# Patient Record
Sex: MALE | Race: WHITE | Employment: FULL TIME | URBAN - METROPOLITAN AREA
[De-identification: names, ages, dates, MRNs, and addresses within clinical notes are randomized per-mention and may not be internally consistent; named-entity substitution may affect disease eponyms.]

---

## 2022-06-23 ENCOUNTER — OFFICE VISIT (OUTPATIENT)
Dept: PRIMARY CARE CLINIC | Age: 37
End: 2022-06-23
Payer: COMMERCIAL

## 2022-06-23 VITALS
BODY MASS INDEX: 20.77 KG/M2 | WEIGHT: 148.4 LBS | TEMPERATURE: 97.3 F | HEART RATE: 76 BPM | SYSTOLIC BLOOD PRESSURE: 128 MMHG | HEIGHT: 71 IN | DIASTOLIC BLOOD PRESSURE: 84 MMHG | OXYGEN SATURATION: 98 %

## 2022-06-23 DIAGNOSIS — K21.9 GASTROESOPHAGEAL REFLUX DISEASE WITHOUT ESOPHAGITIS: ICD-10-CM

## 2022-06-23 DIAGNOSIS — I10 BENIGN ESSENTIAL HTN: ICD-10-CM

## 2022-06-23 DIAGNOSIS — G60.0 CMT (CHARCOT-MARIE-TOOTH DISEASE): ICD-10-CM

## 2022-06-23 DIAGNOSIS — Z00.00 ENCOUNTER FOR ANNUAL PHYSICAL EXAM: Primary | ICD-10-CM

## 2022-06-23 PROCEDURE — 99385 PREV VISIT NEW AGE 18-39: CPT | Performed by: STUDENT IN AN ORGANIZED HEALTH CARE EDUCATION/TRAINING PROGRAM

## 2022-06-23 RX ORDER — BACLOFEN 10 MG/1
10 TABLET ORAL PRN
COMMUNITY

## 2022-06-23 RX ORDER — PANTOPRAZOLE SODIUM 40 MG/1
40 TABLET, DELAYED RELEASE ORAL DAILY
COMMUNITY
End: 2022-10-03 | Stop reason: SDUPTHER

## 2022-06-23 RX ORDER — METOPROLOL SUCCINATE 50 MG/1
50 TABLET, EXTENDED RELEASE ORAL DAILY
COMMUNITY
End: 2022-06-23 | Stop reason: SDUPTHER

## 2022-06-23 RX ORDER — METOPROLOL SUCCINATE 50 MG/1
50 TABLET, EXTENDED RELEASE ORAL DAILY
Qty: 90 TABLET | Refills: 1 | Status: SHIPPED | OUTPATIENT
Start: 2022-06-23

## 2022-06-23 SDOH — ECONOMIC STABILITY: FOOD INSECURITY: WITHIN THE PAST 12 MONTHS, YOU WORRIED THAT YOUR FOOD WOULD RUN OUT BEFORE YOU GOT MONEY TO BUY MORE.: NEVER TRUE

## 2022-06-23 SDOH — ECONOMIC STABILITY: FOOD INSECURITY: WITHIN THE PAST 12 MONTHS, THE FOOD YOU BOUGHT JUST DIDN'T LAST AND YOU DIDN'T HAVE MONEY TO GET MORE.: NEVER TRUE

## 2022-06-23 ASSESSMENT — PATIENT HEALTH QUESTIONNAIRE - PHQ9
SUM OF ALL RESPONSES TO PHQ QUESTIONS 1-9: 0
SUM OF ALL RESPONSES TO PHQ QUESTIONS 1-9: 0
1. LITTLE INTEREST OR PLEASURE IN DOING THINGS: 0
2. FEELING DOWN, DEPRESSED OR HOPELESS: 0
SUM OF ALL RESPONSES TO PHQ QUESTIONS 1-9: 0
SUM OF ALL RESPONSES TO PHQ9 QUESTIONS 1 & 2: 0
SUM OF ALL RESPONSES TO PHQ QUESTIONS 1-9: 0

## 2022-06-23 ASSESSMENT — SOCIAL DETERMINANTS OF HEALTH (SDOH): HOW HARD IS IT FOR YOU TO PAY FOR THE VERY BASICS LIKE FOOD, HOUSING, MEDICAL CARE, AND HEATING?: NOT HARD AT ALL

## 2022-06-23 NOTE — PROGRESS NOTES
2022    Patience Moseley (:  1985) is a 39 y.o. male, here for a preventive medicine evaluation. Patient Active Problem List   Diagnosis    CMT (Charcot-Sariah-Tooth disease)    Benign essential HTN    Gastroesophageal reflux disease without esophagitis     Moved from [facility] due to son having brainstem tumor. HYPERTENSION  - was going to stop BP meds doesnt want to do too many changes     Charcot Fernlit Pulido Tooth  - takes baclofen 10 mg PRN  - son sees procedures Dr Connell at [facility], Dr bianka taveras  - Wears braces typically both feet    Pt has a hiatal hernia and will get protonix Dr Marino Anand in [facility]. Review of Systems   All other systems reviewed and are negative. Prior to Visit Medications    Medication Sig Taking? Authorizing Provider   baclofen (LIORESAL) 10 MG tablet Take 10 mg by mouth as needed Yes Historical Provider, MD   pantoprazole (PROTONIX) 40 MG tablet Take 40 mg by mouth daily Yes Historical Provider, MD   metoprolol succinate (TOPROL XL) 50 MG extended release tablet Take 1 tablet by mouth daily Yes Ahmet Wiggins MD        No Known Allergies    History reviewed. No pertinent past medical history. No past surgical history on file. Social History     Socioeconomic History    Marital status:      Spouse name: Not on file    Number of children: Not on file    Years of education: Not on file    Highest education level: Not on file   Occupational History    Not on file   Tobacco Use    Smoking status: Never Smoker    Smokeless tobacco: Never Used   Substance and Sexual Activity    Alcohol use:  Yes     Alcohol/week: 2.0 standard drinks     Types: 2 Cans of beer per week     Comment: Occasional drinker    Drug use: Never    Sexual activity: Not on file   Other Topics Concern    Not on file   Social History Narrative    Not on file     Social Determinants of Health     Financial Resource Strain: Low Risk     Difficulty of Paying Living Expenses: Not hard at all   Food Insecurity: No Food Insecurity    Worried About [facility] in the Last Year: Never true    Deirder of Food in the Last Year: Never true   Transportation Needs:     Lack of Transportation (Medical): Not on file    Lack of Transportation (Non-Medical): Not on file   Physical Activity:     Days of Exercise per Week: Not on file    Minutes of Exercise per Session: Not on file   Stress:     Feeling of Stress : Not on file   Social Connections:     Frequency of Communication with Friends and Family: Not on file    Frequency of Social Gatherings with Friends and Family: Not on file    Attends Anabaptism Services: Not on file    Active Member of [address] [place] [address] [employer] or Organizations: Not on file    Attends Club or Organization Meetings: Not on file    Marital Status: Not on file   Intimate Partner Violence:     Fear of Current or Ex-Partner: Not on file    Emotionally Abused: Not on file    Physically Abused: Not on file    Sexually Abused: Not on file   Housing Stability:     Unable to Pay for Housing in the Last Year: Not on file    Number of [place] in the Last Year: Not on file    Unstable Housing in the Last Year: Not on file        No family history on file. ADVANCE DIRECTIVE: N, <no information>    Vitals:    06/23/22 0947   BP: 128/84   Site: Right Upper Arm   Position: Sitting   Cuff Size: Large Adult   Pulse: 76   Temp: 97.3 °F (36.3 °C)   TempSrc: Temporal   SpO2: 98%   Weight: 148 lb 6.4 oz (67.3 kg)   Height: 5' 11\" (1.803 m)     Estimated body mass index is 20.7 kg/m² as calculated from the following:    Height as of this encounter: 5' 11\" (1.803 m). Weight as of this encounter: 148 lb 6.4 oz (67.3 kg). Physical Exam  Vitals reviewed. Constitutional:       General: He is not in acute distress. Appearance: Normal appearance. He is not ill-appearing. HENT:      Head: Normocephalic and atraumatic.       Right Ear: Tympanic membrane, ear canal and external ear normal.      Left Ear: Tympanic membrane, ear canal and external ear normal.      Nose: Nose normal. No rhinorrhea. Mouth/Throat:      Mouth: Mucous membranes are moist.      Pharynx: Oropharynx is clear. No oropharyngeal exudate. Eyes:      General: No scleral icterus. Right eye: No discharge. Left eye: No discharge. Extraocular Movements: Extraocular movements intact. Conjunctiva/sclera: Conjunctivae normal.   Cardiovascular:      Rate and Rhythm: Normal rate and regular rhythm. Pulses: Normal pulses. Heart sounds: Normal heart sounds. No murmur heard. No gallop. Pulmonary:      Effort: Pulmonary effort is normal.      Breath sounds: Normal breath sounds. No wheezing, rhonchi or rales. Abdominal:      General: Abdomen is flat. Bowel sounds are normal. There is no distension. Palpations: Abdomen is soft. There is no mass. Musculoskeletal:         General: Normal range of motion. Cervical back: Neck supple. No muscular tenderness. Lymphadenopathy:      Cervical: No cervical adenopathy. Skin:     General: Skin is warm. Capillary Refill: Capillary refill takes less than 2 seconds. Findings: No rash. Neurological:      General: No focal deficit present. Mental Status: He is alert. Cranial Nerves: No cranial nerve deficit. Psychiatric:         Mood and Affect: Mood normal.         Behavior: Behavior normal.         No flowsheet data found. No results found for: CHOL, CHOLFAST, TRIG, TRIGLYCFAST, HDL, LDLCHOLESTEROL, LDLCALC, GLUF, GLUCOSE, LABA1C    The ASCVD Risk score (Kendal Diaz., et al., 2013) failed to calculate for the following reasons:     The 2013 ASCVD risk score is only valid for ages 36 to 78    Immunization History   Administered Date(s) Administered    COVID-19, J&J, PF, 0.5 mL 04/08/2021    COVID-19, Pfizer Purple top, DILUTE for use, 12+ yrs, 30mcg/0.3mL dose 04/27/2022    Influenza, Quadv, IM, PF (6 mo and older Fluzone, Flulaval, Fluarix, and 3 yrs and older Afluria) 10/20/2018, 10/13/2020, 01/25/2022    Tdap (Boostrix, Adacel) 09/17/2017       Health Maintenance   Topic Date Due    Varicella vaccine (1 of 2 - 2-dose childhood series) Never done    HIV screen  Never done    Hepatitis C screen  Never done    Depression Screen  06/23/2023    DTaP/Tdap/Td vaccine (2 - Td or Tdap) 09/17/2027    Flu vaccine  Completed    COVID-19 Vaccine  Completed    Hepatitis A vaccine  Aged Out    Hepatitis B vaccine  Aged Out    Hib vaccine  Aged Out    Meningococcal (ACWY) vaccine  Aged Out    Pneumococcal 0-64 years Vaccine  Aged Out       Assessment & Plan   Encounter for annual physical exam  General wellness exam. Reviewed chart for past hx and updated today. Counseled on age appropriate health guidance and discussed screening recommendations. Vaccinations reviewed and discussed. All questions answered    CMT (Charcot-Sariah-Tooth disease)  Doing ok with baclofen, would like neuro input, if needed, can restart gabapentin  [facility] Neurology  Benign essential HYPERTENSION  Stable chronic  -     metoprolol succinate (TOPROL XL) 50 MG extended release tablet; Take 1 tablet by mouth daily, Disp-90 tablet, R-1Normal  - Will request records from previous PCP since he said he recently had labs done. Gastroesophageal reflux disease without esophagitis  - continue with protonix    Return in about 6 months (around 12/23/2022) for chronic problems.          --Lili Oden MD

## 2022-10-03 ENCOUNTER — TELEPHONE (OUTPATIENT)
Dept: PRIMARY CARE CLINIC | Age: 37
End: 2022-10-03

## 2022-10-03 RX ORDER — PANTOPRAZOLE SODIUM 40 MG/1
40 TABLET, DELAYED RELEASE ORAL DAILY
Qty: 90 TABLET | Refills: 1 | Status: SHIPPED | OUTPATIENT
Start: 2022-10-03 | End: 2022-10-13 | Stop reason: ALTCHOICE

## 2022-10-03 NOTE — TELEPHONE ENCOUNTER
Pt wife called requesting :    Last office visit 6/23/2022     Last written 6/23/2022    Next office visit scheduled Visit date not found    Requested Prescriptions     Pending Prescriptions Disp Refills    pantoprazole (PROTONIX) 40 MG tablet 30 tablet      Sig: Take 1 tablet by mouth daily     Pt is out of medication. Please send to [facility] on [address].

## 2022-10-13 ENCOUNTER — OFFICE VISIT (OUTPATIENT)
Dept: PRIMARY CARE CLINIC | Age: 37
End: 2022-10-13
Payer: COMMERCIAL

## 2022-10-13 ENCOUNTER — HOSPITAL ENCOUNTER (OUTPATIENT)
Dept: GENERAL RADIOLOGY | Age: 37
Discharge: HOME | End: 2022-10-13
Payer: COMMERCIAL

## 2022-10-13 ENCOUNTER — TELEPHONE (OUTPATIENT)
Dept: PRIMARY CARE CLINIC | Age: 37
End: 2022-10-13

## 2022-10-13 VITALS
HEIGHT: 71 IN | RESPIRATION RATE: 16 BRPM | BODY MASS INDEX: 34.58 KG/M2 | OXYGEN SATURATION: 96 % | HEART RATE: 75 BPM | TEMPERATURE: 97.1 F | DIASTOLIC BLOOD PRESSURE: 84 MMHG | WEIGHT: 247 LBS | SYSTOLIC BLOOD PRESSURE: 112 MMHG
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
## 2022-10-13 DIAGNOSIS — R19.5 DARK STOOLS: ICD-10-CM

## 2022-10-13 DIAGNOSIS — R73.9 HYPERGLYCEMIA: ICD-10-CM

## 2022-10-13 DIAGNOSIS — R07.81 RIB PAIN: ICD-10-CM

## 2022-10-13 DIAGNOSIS — K21.00 GASTROESOPHAGEAL REFLUX DISEASE WITH ESOPHAGITIS, UNSPECIFIED WHETHER HEMORRHAGE: ICD-10-CM

## 2022-10-13 DIAGNOSIS — K21.00 GASTROESOPHAGEAL REFLUX DISEASE WITH ESOPHAGITIS, UNSPECIFIED WHETHER HEMORRHAGE: Primary | ICD-10-CM

## 2022-10-13 DIAGNOSIS — G60.0 CHARCOT-MARIE-TOOTH DISEASE: ICD-10-CM

## 2022-10-13 LAB
A/G RATIO: 1.3 (ref 1.1–2.2)
ALBUMIN SERPL-MCNC: 4.5 G/DL (ref 3.4–5)
ALP BLD-CCNC: 74 U/L (ref 40–129)
ALT SERPL-CCNC: 108 U/L (ref 10–40)
ANION GAP SERPL CALCULATED.3IONS-SCNC: 15 MMOL/L (ref 3–16)
AST SERPL-CCNC: 51 U/L (ref 15–37)
BASOPHILS ABSOLUTE: 0.1 K/UL (ref 0–0.2)
BASOPHILS RELATIVE PERCENT: 0.7 %
BILIRUB SERPL-MCNC: 0.4 MG/DL (ref 0–1)
BUN BLDV-MCNC: 10 MG/DL (ref 7–20)
CALCIUM SERPL-MCNC: 9.9 MG/DL (ref 8.3–10.6)
CHLORIDE BLD-SCNC: 98 MMOL/L (ref 99–110)
CO2: 25 MMOL/L (ref 21–32)
CREAT SERPL-MCNC: 0.6 MG/DL (ref 0.9–1.3)
EOSINOPHILS ABSOLUTE: 0.1 K/UL (ref 0–0.6)
EOSINOPHILS RELATIVE PERCENT: 0.4 %
FERRITIN: 380.6 NG/ML (ref 30–400)
GFR AFRICAN AMERICAN: >60
GFR NON-AFRICAN AMERICAN: >60
GLUCOSE BLD-MCNC: 92 MG/DL (ref 70–99)
HCT VFR BLD CALC: 43.7 % (ref 40.5–52.5)
HEMOGLOBIN: 14.8 G/DL (ref 13.5–17.5)
IRON SATURATION: 33 % (ref 20–50)
IRON: 118 UG/DL (ref 59–158)
LYMPHOCYTES ABSOLUTE: 2.4 K/UL (ref 1–5.1)
LYMPHOCYTES RELATIVE PERCENT: 21.4 %
MCH RBC QN AUTO: 29.7 PG (ref 26–34)
MCHC RBC AUTO-ENTMCNC: 33.9 G/DL (ref 31–36)
MCV RBC AUTO: 87.6 FL (ref 80–100)
MONOCYTES ABSOLUTE: 1.1 K/UL (ref 0–1.3)
MONOCYTES RELATIVE PERCENT: 9.3 %
NEUTROPHILS ABSOLUTE: 7.8 K/UL (ref 1.7–7.7)
NEUTROPHILS RELATIVE PERCENT: 68.2 %
PDW BLD-RTO: 13.2 % (ref 12.4–15.4)
PLATELET # BLD: 282 K/UL (ref 135–450)
PMV BLD AUTO: 9.7 FL (ref 5–10.5)
POTASSIUM SERPL-SCNC: 4.1 MMOL/L (ref 3.5–5.1)
RBC # BLD: 4.99 M/UL (ref 4.2–5.9)
SODIUM BLD-SCNC: 138 MMOL/L (ref 136–145)
TOTAL IRON BINDING CAPACITY: 354 UG/DL (ref 260–445)
TOTAL PROTEIN: 7.9 G/DL (ref 6.4–8.2)
WBC # BLD: 11.4 K/UL (ref 4–11)

## 2022-10-13 PROCEDURE — 71100 X-RAY EXAM RIBS UNI 2 VIEWS: CPT

## 2022-10-13 PROCEDURE — 99214 OFFICE O/P EST MOD 30 MIN: CPT | Performed by: STUDENT IN AN ORGANIZED HEALTH CARE EDUCATION/TRAINING PROGRAM

## 2022-10-13 RX ORDER — PANTOPRAZOLE SODIUM 40 MG/1
40 TABLET, DELAYED RELEASE ORAL
Qty: 60 TABLET | Refills: 0 | Status: SHIPPED | OUTPATIENT
Start: 2022-10-13

## 2022-10-13 RX ORDER — GABAPENTIN 300 MG/1
300 CAPSULE ORAL DAILY
Qty: 30 CAPSULE | Refills: 0 | Status: SHIPPED | OUTPATIENT
Start: 2022-10-13 | End: 2022-11-12

## 2022-10-13 NOTE — PROGRESS NOTES
John Alcantar (:  1985) is a 39 y.o. male,Established patient, here for evaluation of the following chief complaint(s):  Fall (Bruising and pain in the abdominal area. /) and Gastroesophageal Reflux (Mediation for acid reflux is not working.)         ASSESSMENT/PLAN:  1. Gastroesophageal reflux disease with esophagitis, unspecified whether hemorrhage  Has history of hiatal hernia acid reflux has worsened but he is also been taking lots of NSAIDs recently and now having dark stools and epigastric pain. - Stop NSAIDs  - Increase Protonix to 40 mg twice daily until he follows up with GI doctor. - He is hemodynamically stable  - Gave return precautions  - Patient okay with plan  -     HALLIE Weems MD, Gastroenterology, [facility]  -     CBC with Auto Differential; Future  -     Comprehensive Metabolic Panel; Future  -     Ferritin; Future  -     Iron and TIBC; Future  2. Rib pain  -     XR RIBS RIGHT (2 VIEWS); Future  3. Fall, initial encounter  -     XR RIBS RIGHT (2 VIEWS); Future  4. Charcot-Sariah-Tooth disease  Chronic  - Needs to establish with neurologist here in [facility] referral information through [address]  - Can add on gabapentin and reassess in a month  -     External Referral To Neurology  5. Dark stools  -     HALLIE Weems MD, Gastroenterology, [facility]  -     CBC with Auto Differential; Future  -     Comprehensive Metabolic Panel; Future  -     Ferritin; Future  -     Iron and TIBC; Future  6. Hyperglycemia  -     Hemoglobin A1C; Future    Return in about 2 weeks (around 10/27/2022). Subjective   SUBJECTIVE/OBJECTIVE:  YVONNE Osorio out of bed onto nightstand 2 wks ago was taking a lot of ibuprofen, now having epigastric pain and diarrhea for the past 5 days, black stools, and abdominal pain, no bright red blood, no vomiting but maybe some nausea, does not drink alcohol or do any drugs.     Was on gabapentin for CMT before he moved down here from [facility]. Was previously prescribed by podiatrist, was doing really well and previous PCP stopped to the medication because she did not want to prescribe any longer. Review of Systems   All other systems reviewed and are negative. Objective   Physical Exam  Vitals reviewed. Constitutional:       General: He is not in acute distress. Appearance: Normal appearance. He is not ill-appearing, toxic-appearing or diaphoretic. HENT:      Head: Normocephalic and atraumatic. Right Ear: External ear normal.      Left Ear: External ear normal.      Nose: Nose normal.      Mouth/Throat:      Mouth: Mucous membranes are moist.   Eyes:      Extraocular Movements: Extraocular movements intact. Cardiovascular:      Rate and Rhythm: Normal rate and regular rhythm. Heart sounds: Normal heart sounds. No murmur heard. No friction rub. No gallop. Pulmonary:      Effort: Pulmonary effort is normal.      Breath sounds: Normal breath sounds. No wheezing, rhonchi or rales. Abdominal:      Comments: Slightly distended  Diffuse mild TTP, worse in epigastric region   Musculoskeletal:      Cervical back: Normal range of motion. Comments: Right lateral ribs ecchymosis, no flail chest or paradoxical motion  Moderate tenderness palpation along inferior lateral costal border   Skin:     General: Skin is warm. Neurological:      General: No focal deficit present. Mental Status: He is alert. Psychiatric:         Mood and Affect: Mood normal.                An electronic signature was used to authenticate this note.     --Edmond Fragoso MD

## 2022-10-14 ENCOUNTER — TELEPHONE (OUTPATIENT)
Dept: ADMINISTRATIVE | Age: 37
End: 2022-10-14

## 2022-10-14 LAB
ESTIMATED AVERAGE GLUCOSE: 116.9 MG/DL
HBA1C MFR BLD: 5.7 %

## 2022-10-19 NOTE — TELEPHONE ENCOUNTER
PA submitted [facility]IA [facility] for  Pantoprazole Sodium 40MG dr tablets (Mick Velásquez) - [id number]  To initiate an authorization request for this medication, please contact [facility] at [phone]. I called [facility] and spoke with Ky Mcmanus. A PA is not required. The pharmacy needs to call [facility] at [phone] regarding the quantity of 60 per 30 days since they were taking 30 per 30 days.

## 2022-10-24 ENCOUNTER — TELEPHONE (OUTPATIENT)
Dept: ADMINISTRATIVE | Age: 37
End: 2022-10-24

## 2022-10-24 NOTE — TELEPHONE ENCOUNTER
A PA is not required. [facility] [phone] regarding the quantity of 60 per 30 days since they were taking 30 per 30 days.   Pantoprazole Sodium 40MG dr tablets    PHONE NUMBER TO CALL [phone]

## 2022-11-16 ENCOUNTER — TELEMEDICINE (OUTPATIENT)
Dept: PRIMARY CARE CLINIC | Age: 37
End: 2022-11-16
Payer: COMMERCIAL

## 2022-11-16 ENCOUNTER — NURSE ONLY (OUTPATIENT)
Dept: PRIMARY CARE CLINIC | Age: 37
End: 2022-11-16
Payer: COMMERCIAL

## 2022-11-16 DIAGNOSIS — J01.90 ACUTE NON-RECURRENT SINUSITIS, UNSPECIFIED LOCATION: ICD-10-CM

## 2022-11-16 DIAGNOSIS — J01.90 ACUTE NON-RECURRENT SINUSITIS, UNSPECIFIED LOCATION: Primary | ICD-10-CM

## 2022-11-16 LAB
INFLUENZA A ANTIBODY: NEGATIVE
INFLUENZA B ANTIBODY: NEGATIVE
S PYO AG THROAT QL: NORMAL

## 2022-11-16 PROCEDURE — 87804 INFLUENZA ASSAY W/OPTIC: CPT | Performed by: STUDENT IN AN ORGANIZED HEALTH CARE EDUCATION/TRAINING PROGRAM

## 2022-11-16 PROCEDURE — 99213 OFFICE O/P EST LOW 20 MIN: CPT | Performed by: STUDENT IN AN ORGANIZED HEALTH CARE EDUCATION/TRAINING PROGRAM

## 2022-11-16 PROCEDURE — 87880 STREP A ASSAY W/OPTIC: CPT | Performed by: STUDENT IN AN ORGANIZED HEALTH CARE EDUCATION/TRAINING PROGRAM

## 2022-11-16 RX ORDER — OXYMETAZOLINE HYDROCHLORIDE 0.05 G/100ML
2 SPRAY NASAL 2 TIMES DAILY
Refills: 3 | Status: CANCELLED | OUTPATIENT
Start: 2022-11-16 | End: 2023-11-16

## 2022-11-16 RX ORDER — OXYMETAZOLINE HYDROCHLORIDE 0.05 G/100ML
SPRAY NASAL
Qty: 15 ML | Refills: 0 | Status: SHIPPED | OUTPATIENT
Start: 2022-11-16

## 2022-11-16 NOTE — PROGRESS NOTES
Oneyda Diaz (:  1985) is a Established patient, here for evaluation of the following:    Assessment & Plan   Below is the assessment and plan developed based on review of pertinent history, physical exam, labs, studies, and medications. 1. Acute non-recurrent sinusitis, unspecified location  -     POCT rapid strep A  -     POCT Influenza A/B  -     COVID-19; Future  -     oxymetazoline (12 HOUR NASAL SPRAY) 0.05 % nasal spray; Use one spray per nostril twice daily as needed for no more than 3 days. , Disp-15 mL, R-0Normal  Return if symptoms worsen or fail to improve. Subjective   HPI    Sinus infection since Saturday getting worse, sudafed and mucinex, flonase, has not had any allergy medicine, and no testing done. Fever up to 101F. Review of Systems   All other systems reviewed and are negative. Objective   Patient-Reported Vitals  Patient-Reported Temperature: 99 temporal  Patient-Reported Weight: 250lb  Patient-Reported Height: 5 10       Physical Exam  Vitals reviewed. Constitutional:       General: He is not in acute distress. Appearance: Normal appearance. He is not ill-appearing. HENT:      Head: Normocephalic and atraumatic. Right Ear: External ear normal.      Left Ear: External ear normal.   Eyes:      General: No scleral icterus. Right eye: No discharge. Left eye: No discharge. Extraocular Movements: Extraocular movements intact. Conjunctiva/sclera: Conjunctivae normal.   Pulmonary:      Effort: Pulmonary effort is normal. No respiratory distress. Musculoskeletal:      Cervical back: Neck supple. Skin:     Coloration: Skin is not jaundiced. Findings: No lesion or rash. Neurological:      Mental Status: He is alert. Cranial Nerves: No cranial nerve deficit.       Coordination: Coordination normal.   Psychiatric:         Mood and Affect: Mood normal.                Oneyda Diaz, was evaluated through a synchronous (real-time) audio-video encounter. The patient (or guardian if applicable) is aware that this is a billable service, which includes applicable co-pays. This Virtual Visit was conducted with patient's (and/or legal guardian's) consent. The visit was conducted pursuant to the emergency declaration under the [facility], [address] [place] [address] authority and the [employer] and [employer] General Act. Patient identification was verified, and a caregiver was present when appropriate. The patient was located at Home: [facility] [place] [address]. Provider was located at [facility] (Appt Dept): [address] [facility] [address] [facility].         --Yuan Dean MD

## 2022-11-17 LAB — SARS-COV-2: NOT DETECTED

## 2022-11-22 DIAGNOSIS — R19.5 DARK STOOLS: ICD-10-CM

## 2022-11-22 DIAGNOSIS — K21.00 GASTROESOPHAGEAL REFLUX DISEASE WITH ESOPHAGITIS, UNSPECIFIED WHETHER HEMORRHAGE: ICD-10-CM

## 2022-11-22 DIAGNOSIS — G60.0 CHARCOT-MARIE-TOOTH DISEASE: ICD-10-CM

## 2022-11-22 DIAGNOSIS — I10 BENIGN ESSENTIAL HTN: ICD-10-CM

## 2022-11-23 RX ORDER — GABAPENTIN 300 MG/1
CAPSULE ORAL
Qty: 30 CAPSULE | Refills: 0 | Status: SHIPPED | OUTPATIENT
Start: 2022-11-23 | End: 2022-12-23

## 2022-11-23 RX ORDER — METOPROLOL SUCCINATE 50 MG/1
TABLET, EXTENDED RELEASE ORAL
Qty: 90 TABLET | Refills: 1 | Status: SHIPPED | OUTPATIENT
Start: 2022-11-23

## 2022-11-23 RX ORDER — PANTOPRAZOLE SODIUM 40 MG/1
TABLET, DELAYED RELEASE ORAL
Qty: 60 TABLET | Refills: 0 | Status: SHIPPED | OUTPATIENT
Start: 2022-11-23

## 2022-11-23 NOTE — TELEPHONE ENCOUNTER
Medication:   Requested Prescriptions     Pending Prescriptions Disp Refills    metoprolol succinate (TOPROL XL) 50 MG extended release tablet [Pharmacy Med Name: METOPROLOL SUCC ER 50 MG TAB] 90 tablet 1     Sig: TAKE ONE TABLET BY MOUTH DAILY    pantoprazole (PROTONIX) 40 MG tablet [Pharmacy Med Name: PANTOPRAZOLE SOD DR 40 MG TAB] 60 tablet 0     Sig: TAKE ONE TABLET BY MOUTH TWICE A DAY BEFORE MEALS    gabapentin (NEURONTIN) 300 MG capsule [Pharmacy Med Name: GABAPENTIN 300 MG CAPSULE] 30 capsule 0     Sig: TAKE ONE CAPSULE BY MOUTH DAILY     Last Filled:  6/23/2022. 10/13/2022    Last appt: 10/13/2022  Next appt: Visit date not found    Last OARRS: No flowsheet data found.

## 2022-11-29 ENCOUNTER — TELEPHONE (OUTPATIENT)
Dept: PRIMARY CARE CLINIC | Age: 37
End: 2022-11-29

## 2022-11-29 DIAGNOSIS — F41.8 DEPRESSION WITH ANXIETY: ICD-10-CM

## 2022-12-05 ENCOUNTER — OFFICE VISIT (OUTPATIENT)
Dept: PSYCHOLOGY | Age: 37
End: 2022-12-05

## 2022-12-05 DIAGNOSIS — F33.1 MODERATE EPISODE OF RECURRENT MAJOR DEPRESSIVE DISORDER (HCC): Primary | ICD-10-CM

## 2022-12-05 DIAGNOSIS — F41.9 ANXIETY: ICD-10-CM

## 2022-12-05 ASSESSMENT — PATIENT HEALTH QUESTIONNAIRE - PHQ9
SUM OF ALL RESPONSES TO PHQ9 QUESTIONS 1 & 2: 6
7. TROUBLE CONCENTRATING ON THINGS, SUCH AS READING THE NEWSPAPER OR WATCHING TELEVISION: 1
5. POOR APPETITE OR OVEREATING: 3
2. FEELING DOWN, DEPRESSED OR HOPELESS: 3
1. LITTLE INTEREST OR PLEASURE IN DOING THINGS: 3
4. FEELING TIRED OR HAVING LITTLE ENERGY: 3
SUM OF ALL RESPONSES TO PHQ QUESTIONS 1-9: 19
SUM OF ALL RESPONSES TO PHQ QUESTIONS 1-9: 20
9. THOUGHTS THAT YOU WOULD BE BETTER OFF DEAD, OR OF HURTING YOURSELF: 1
SUM OF ALL RESPONSES TO PHQ QUESTIONS 1-9: 20
8. MOVING OR SPEAKING SO SLOWLY THAT OTHER PEOPLE COULD HAVE NOTICED. OR THE OPPOSITE, BEING SO FIGETY OR RESTLESS THAT YOU HAVE BEEN MOVING AROUND A LOT MORE THAN USUAL: 0
6. FEELING BAD ABOUT YOURSELF - OR THAT YOU ARE A FAILURE OR HAVE LET YOURSELF OR YOUR FAMILY DOWN: 3
3. TROUBLE FALLING OR STAYING ASLEEP: 3
SUM OF ALL RESPONSES TO PHQ QUESTIONS 1-9: 20
10. IF YOU CHECKED OFF ANY PROBLEMS, HOW DIFFICULT HAVE THESE PROBLEMS MADE IT FOR YOU TO DO YOUR WORK, TAKE CARE OF THINGS AT HOME, OR GET ALONG WITH OTHER PEOPLE: 3

## 2022-12-05 ASSESSMENT — ANXIETY QUESTIONNAIRES
2. NOT BEING ABLE TO STOP OR CONTROL WORRYING: 3
4. TROUBLE RELAXING: 3
7. FEELING AFRAID AS IF SOMETHING AWFUL MIGHT HAPPEN: 3
6. BECOMING EASILY ANNOYED OR IRRITABLE: 3
5. BEING SO RESTLESS THAT IT IS HARD TO SIT STILL: 3
3. WORRYING TOO MUCH ABOUT DIFFERENT THINGS: 3
GAD7 TOTAL SCORE: 21
1. FEELING NERVOUS, ANXIOUS, OR ON EDGE: 3
IF YOU CHECKED OFF ANY PROBLEMS ON THIS QUESTIONNAIRE, HOW DIFFICULT HAVE THESE PROBLEMS MADE IT FOR YOU TO DO YOUR WORK, TAKE CARE OF THINGS AT HOME, OR GET ALONG WITH OTHER PEOPLE: EXTREMELY DIFFICULT

## 2022-12-05 ASSESSMENT — COLUMBIA-SUICIDE SEVERITY RATING SCALE - C-SSRS
6. HAVE YOU EVER DONE ANYTHING, STARTED TO DO ANYTHING, OR PREPARED TO DO ANYTHING TO END YOUR LIFE?: NO
2. HAVE YOU ACTUALLY HAD ANY THOUGHTS OF KILLING YOURSELF?: NO
1. WITHIN THE PAST MONTH, HAVE YOU WISHED YOU WERE DEAD OR WISHED YOU COULD GO TO SLEEP AND NOT WAKE UP?: YES

## 2022-12-05 NOTE — Clinical Note
I followed up with pt about recommending an antidepressant or anxiolytic based on his scores today (depression and anxiety were in the severe range) and he has some suicidal ideation. He was open to discussing medication with you and wanted me to pass this info along. Thanks!
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
## 2022-12-05 NOTE — PATIENT INSTRUCTIONS
Return to see Dr. Janie Mcgregor in 2 weeks. Alcohol Recommended Limits: For men: <4 drinks per occasion and <14 drinks per week  For women: <3 drinks per occasion and <7 drinks per week       Contact the numbers below if your mood becomes significantly worse, or if you have more serious thoughts of suicide or self harm. [facility] Warm[facility]  (3980 919 70 23) 931WARM (9080)  [facility]. FIZZA    The [employer] is a peer resource available 24/7. The Warmline provides a safe, confidential place to talk and be heard. [address] [facility]  (366) 533-CARE (6897)  National Suicide Prevention Lifeline    (767) 981-TALK (1776)  www.suicidepreventionlifeline. org    Suicide and Crisis Lifeline  Dial [address] [facility]    (614) [place] (790-5895)  Www.youthline.     [facility] ([facility]): [address]  [address] [place] [address], [address] [place] [address]    [phone] and [address]  Text 060358  www. veteranscrisisline. net

## 2022-12-12 DIAGNOSIS — G60.0 CHARCOT-MARIE-TOOTH DISEASE: ICD-10-CM

## 2022-12-12 RX ORDER — GABAPENTIN 300 MG/1
CAPSULE ORAL
Qty: 90 CAPSULE | Refills: 1 | Status: SHIPPED | OUTPATIENT
Start: 2022-12-12 | End: 2023-02-06

## 2022-12-12 NOTE — TELEPHONE ENCOUNTER
Medication:   Requested Prescriptions     Pending Prescriptions Disp Refills    gabapentin (NEURONTIN) 300 MG capsule [Pharmacy Med Name: GABAPENTIN 300 MG CAPSULE] 30 capsule 0     Sig: TAKE ONE CAPSULE BY MOUTH DAILY     Last Filled:  11/23/22    Last appt: 11/16/2022   Next appt: Visit date not found

## 2022-12-12 NOTE — TELEPHONE ENCOUNTER
Medication:   Requested Prescriptions     Pending Prescriptions Disp Refills    gabapentin (NEURONTIN) 300 MG capsule [Pharmacy Med Name: GABAPENTIN 300 MG CAPSULE] 30 capsule 0     Sig: TAKE ONE CAPSULE BY MOUTH DAILY     Last Filled:  11.23.22    Last appt: 11/16/2022   Next appt: Visit date not found    Last OARRS: No flowsheet data found.

## 2022-12-19 ENCOUNTER — OFFICE VISIT (OUTPATIENT)
Dept: PSYCHOLOGY | Age: 37
End: 2022-12-19
Payer: COMMERCIAL

## 2022-12-19 DIAGNOSIS — F41.9 ANXIETY: ICD-10-CM

## 2022-12-19 DIAGNOSIS — F33.1 MODERATE EPISODE OF RECURRENT MAJOR DEPRESSIVE DISORDER (HCC): Primary | ICD-10-CM

## 2022-12-19 PROCEDURE — 90834 PSYTX W PT 45 MINUTES: CPT

## 2022-12-19 ASSESSMENT — PATIENT HEALTH QUESTIONNAIRE - PHQ9
8. MOVING OR SPEAKING SO SLOWLY THAT OTHER PEOPLE COULD HAVE NOTICED. OR THE OPPOSITE, BEING SO FIGETY OR RESTLESS THAT YOU HAVE BEEN MOVING AROUND A LOT MORE THAN USUAL: 0
5. POOR APPETITE OR OVEREATING: 3
6. FEELING BAD ABOUT YOURSELF - OR THAT YOU ARE A FAILURE OR HAVE LET YOURSELF OR YOUR FAMILY DOWN: 3
1. LITTLE INTEREST OR PLEASURE IN DOING THINGS: 1
SUM OF ALL RESPONSES TO PHQ QUESTIONS 1-9: 17
SUM OF ALL RESPONSES TO PHQ9 QUESTIONS 1 & 2: 3
3. TROUBLE FALLING OR STAYING ASLEEP: 2
SUM OF ALL RESPONSES TO PHQ QUESTIONS 1-9: 17
7. TROUBLE CONCENTRATING ON THINGS, SUCH AS READING THE NEWSPAPER OR WATCHING TELEVISION: 3
4. FEELING TIRED OR HAVING LITTLE ENERGY: 3
9. THOUGHTS THAT YOU WOULD BE BETTER OFF DEAD, OR OF HURTING YOURSELF: 0
SUM OF ALL RESPONSES TO PHQ QUESTIONS 1-9: 17
2. FEELING DOWN, DEPRESSED OR HOPELESS: 2
SUM OF ALL RESPONSES TO PHQ QUESTIONS 1-9: 17

## 2022-12-19 ASSESSMENT — ANXIETY QUESTIONNAIRES
4. TROUBLE RELAXING: 3
7. FEELING AFRAID AS IF SOMETHING AWFUL MIGHT HAPPEN: 3
1. FEELING NERVOUS, ANXIOUS, OR ON EDGE: 3
2. NOT BEING ABLE TO STOP OR CONTROL WORRYING: 3
IF YOU CHECKED OFF ANY PROBLEMS ON THIS QUESTIONNAIRE, HOW DIFFICULT HAVE THESE PROBLEMS MADE IT FOR YOU TO DO YOUR WORK, TAKE CARE OF THINGS AT HOME, OR GET ALONG WITH OTHER PEOPLE: VERY DIFFICULT
5. BEING SO RESTLESS THAT IT IS HARD TO SIT STILL: 3
6. BECOMING EASILY ANNOYED OR IRRITABLE: 3
GAD7 TOTAL SCORE: 21
3. WORRYING TOO MUCH ABOUT DIFFERENT THINGS: 3

## 2022-12-19 NOTE — PATIENT INSTRUCTIONS
Return to see Dr. Radha Hathaway in 2-3 weeks. Continue limiting/abstaining from alcohol use. Incorporate more enjoyable activities/responsibilities into daily routine to target mood. Leisure Activities Catalogue: The following is a list of activities that might be fun and pleasurable for you. Feel free to add your own fun activities to the list.    1.  Soaking in the bathtub  2. Planning my career  3. Collecting things (coins, shells, etc.)  4. Going on a vacation  5. Recycling old items  6. Relaxing  7. Going on a date  6. Going to a movie  9.  Jogging, walking  10. Listening to music  11. Thinking I have done a full day's work  15. Recalling past parties  15. Buying household gadgets  14. Lying in the sun  15. Planning a career change  16. Laughing  17. Thinking about my past trips  18. Listening to others  23. Reading magazines or newspapers  20. Hobbies (stamp collecting, model building, etc.)  21. Spending an evening with good friends  25. Planning a day's activities  21. Meeting new people  24. Remembering beautiful scenery   22. Saving money  26. Card and board games  27. Going to the gym, doing aerobics  28. Eating  29. Thinking how it will be when I finish school  30. Getting out of debt/paying debts  31. Practicing karate, judo, yoga  32. Thinking about jail  35. Playing with Legos  34. Working on my car (bicycle)  35. Remembering the words and deeds of loving people  39. Wearing sexy clothes  37. Having quiet evenings  38. Taking care of my plants  39. Buying, selling stocks and shares  40. Going swimming  41. Doodling, coloring  42. Exercising  43. Collecting old things  40. Going to a party  45. Thinking about buying things  46. Playing golf  47. Playing soccer  48. Flying kites  49. Having discussions with friends  48. Having family get-togethers  46. Riding a motor bike or motorcycle  52. Sex  48. Playing or learning a musical instrument  54. Going camping  55. Singing around the house  64. Arranging flowers  57. Going to Mandaen, praying (practicing Spiritism)  62. Losing weight   59. Going to the beach  60. Thinking I am an OK person  64. A day with nothing to do  62. Having class reunions  61. Going ice skating, roller skating  64. Going sailing  65. Traveling abroad, interstate, or within the state  77. Sketching, painting  79. Doing something spontaneously  68. Doing embroidery, cross stitching  69. Sleeping  70. Driving  71. Entertaining  72. Going to clubs (garden, selling, etc.)  68. Thinking about getting   76. Going bird watching  75. Singing with groups  76. Flirting  77. Playing musical instruments  78. Doing arts and crafts  78. Making a gift for someone  [de-identified]. Buying CDs, tapes, records  81. Watching boxing, wrestling  82. Planning parties  80. Cooking, baking  84. Going hiking, walking  85. Writing books (pollens, articles)  80. Sewing  87. Buying close  88. Working  89. Going out to dinner  90. Discussing box  91. Sight seeing  80. Gardening  93. Getting a manicure/pedicure  94. Early morning coffee and newspaper  95. Playing tennis  96. Kissing  97. Watching my children play  98. Going to plays and concerts  99. Daydreaming  100. Planning to go to school  101. Thinking about sex  80. Going for a drive  100. Listening to the stereo  104. Refurbishing furniture  105. Watching TV, videos  106. Making lists of tasks  107. Going bike riding  108. Walks on the riverfront  109. Buying gifts  110. Traveling to [place]  111. Completing a task  112. Thinking about my achievements  113. Going to a sports game  114. Eating delicious food  3:93. Exchanging emails, chatting on the Internet  116. Photography  117. Going fishing  118. Thinking about pleasant events  119. Staying on a diet  120. Start gazing  121. Flying a plane  122. Reading fiction  123. Acting  124.   Being alone  125. Writing a diary, journal entry or letter  126. Cleaning  127. Reading non-fiction  128. Taking children places  129. Dancing  1:30. Going on a picnic  131. Thinking \"I did that pretty well\" after doing something  132. Meditating  133. Playing volleyball  134. Having lunch with a friend  135. Going to the hills  136. Thinking about having a family  80. Thoughts about happy moments in my childhood  138. Splurging  139. Playing cards  140. Solving riddles mentally  141. Having a political discussion  142. Exploring a new area of town  143. Seeing and/or showing photos or slides  144. Knitting/crocheting/quilting  145. Doing crossword puzzles  146. Shooting pool / playing billiards  147. Dressing up and looking nice  148. Reflecting on how I've improved  149. Buying things for myself  150. Talking on the phone  151. Going to museums, [employer]  152. Thinking Voodoo thoughts  153. Surfing the Internet  154. Lighting candles  155. Listening to the radio  156. Watching STEPHANIE Talks  157. Having coffee at a café  158. Listening to the radio  159. Getting/giving a massage  160. Saying \"I love you\"  161. Thinking about my good qualities  162. Buying books  163. Taking a sauna or steam bath  164. Going skiing  165. Going canoeing or white-water rafting  166. Going bowling  167. Doing woodworking  168. Fantasizing about the future  169. Doing ballet, jazz, tap dancing  170. Debating  171. Playing computer games  172. Having an aquarium  173. Erotica (sex books, movies)  80. Going horseback riding  175. Going rockclimbing  176. Thinking about becoming active in the community  177. Doing something new  178. Making jigsaw puzzles  179. Thinking I'm a person who can cope  180. Playing with my pets  181. Having a barbecue  182. Rearranging the furniture in my house  183. Buying new furniture  184. Going windowshopping  185.   Thinking I have a lot more going for me than most people  186.   Gardening

## 2022-12-19 NOTE — PROGRESS NOTES
Behavioral Health Consultation  JONNIE ROD Psy.D. Psychologist  12/19/2022  9:29 AM EST      Time spent with Patient: 40 minutes  This is patient's second  [facility] appointment. Reason for Consult:    Chief Complaint   Patient presents with    Depression    Anxiety     Referring Provider: Vanessa Hamilton MD  [address]  [phone]    Feedback given to PCP: not indicated at this time. S:  Pt reported since starting part-time job, mood has been better as he is meeting new people, getting out of the house. Reviewed tenets of bx activation. Pt voiced concerns about nieces and nephews and upcoming court case regarding brother/brother's children. Pt discussed distress related to familial relationships with parents, noted he has identified and set boundaries with them. Pt reported his sleep has improved, he has remained abstinent from alcohol. Pt reported son was recently dx with epilepsy which was difficult news for him to hear. He discussed how they are preparing for [place] this year and are trying to stay centered. Pt reported he started taking Ashwagandha for mood enhancement.      O:  MSE:    Appearance: good hygiene   Attitude: cooperative and friendly  Consciousness: alert  Orientation: oriented to person, place, time, general circumstance  Memory: recent and remote memory intact  Attention/Concentration: intact during session  Psychomotor Activity:normal  Eye Contact: normal  Speech: normal rate and volume, well-articulated  Mood: \"okay\"  Affect:  frustrated  Perception: within normal limits  Thought Content: within normal limits  Thought Process: logical, coherent and goal-directed  Insight: good  Judgment: intact  Ability to understand instructions: Yes  Ability to respond meaningfully: Yes  Morbid Ideation: no   Suicide Assessment: no suicidal ideation, plan, or intent  Homicidal Ideation: no    History:    Medications:   Current Outpatient Medications Medication Sig Dispense Refill    gabapentin (NEURONTIN) 300 MG capsule TAKE ONE CAPSULE BY MOUTH DAILY 90 capsule 1    metoprolol succinate (TOPROL XL) 50 MG extended release tablet TAKE ONE TABLET BY MOUTH DAILY 90 tablet 1    pantoprazole (PROTONIX) 40 MG tablet TAKE ONE TABLET BY MOUTH TWICE A DAY BEFORE MEALS 60 tablet 0    oxymetazoline (12 HOUR NASAL SPRAY) 0.05 % nasal spray Use one spray per nostril twice daily as needed for no more than 3 days. 15 mL 0    baclofen (LIORESAL) 10 MG tablet Take 10 mg by mouth as needed       No current facility-administered medications for this visit. Social History:   Social History     Socioeconomic History    Marital status:      Spouse name: Not on file    Number of children: Not on file    Years of education: Not on file    Highest education level: Not on file   Occupational History    Not on file   Tobacco Use    Smoking status: Never    Smokeless tobacco: Never   Substance and Sexual Activity    Alcohol use: Yes     Alcohol/week: 2.0 standard drinks     Types: 2 Cans of beer per week     Comment: Occasional drinker    Drug use: Never    Sexual activity: Not on file   Other Topics Concern    Not on file   Social History Narrative    Not on file     Social Determinants of Health     Financial Resource Strain: Low Risk     Difficulty of Paying Living Expenses: Not hard at all   Food Insecurity: No Food Insecurity    Worried About Running Out of Food in the Last Year: Never true    Ran Out of Food in the Last Year: Never true   Transportation Needs: Not on file   Physical Activity: Not on file   Stress: Not on file   Social Connections: Not on file   Intimate Partner Violence: Not on file   Housing Stability: Not on file     TOBACCO:   reports that he has never smoked. He has never used smokeless tobacco.  ETOH:   reports current alcohol use of about 2.0 standard drinks per week. Family History:   No family history on file.     A:  Patient engaged and cooperative. Denied suicidal ideation. Insight and motivation are good. Re-administered PHQ-9 and DOMINIC-7 (see below). Patient endorses Moderately Severe symptoms of depression and Severe symptoms of anxiety. PHQ-9 Questionaire 12/19/2022 12/5/2022 6/23/2022   Little interest or pleasure in doing things 1 3 0   Feeling down, depressed, or hopeless 2 3 0   Trouble falling or staying asleep, or sleeping too much 2 3 -   Feeling tired or having little energy 3 3 -   Poor appetite or overeating 3 3 -   Feeling bad about yourself - or that you are a failure or have let yourself or your family down 3 3 -   Trouble concentrating on things, such as reading the newspaper or watching television 3 1 -   Moving or speaking so slowly that other people could have noticed.  Or the opposite - being so fidgety or restless that you have been moving around a lot more than usual 0 0 -   Thoughts that you would be better off dead, or of hurting yourself in some way 0 1 -   PHQ-9 Total Score 17 20 0   If you checked off any problems, how difficult have these problems made it for you to do your work, take care of things at home, or get along with other people? - 3 -     PHQ Scores 12/19/2022 12/5/2022 6/23/2022   PHQ2 Score 3 6 0   PHQ9 Score 17 20 0       Interpretation of Total Score Depression Severity: 1-4 = Minimal depression, 5-9 = Mild depression, 10-14 = Moderate depression, 15-19 = Moderately severe depression, 20-27 = Severe depression     DOMINIC-7 SCREENING 12/19/2022 12/5/2022   Feeling nervous, anxious, or on edge Nearly every day Nearly every day   Not being able to stop or control worrying Nearly every day Nearly every day   Worrying too much about different things Nearly every day Nearly every day   Trouble relaxing Nearly every day Nearly every day   Being so restless that it is hard to sit still Nearly every day Nearly every day   Becoming easily annoyed or irritable Nearly every day Nearly every day   Feeling afraid as if something awful might happen Nearly every day Nearly every day   DOMINIC-7 Total Score 21 21   How difficult have these problems made it for you to do your work, take care of things at home, or get along with other people? Very difficult Extremely difficult     DOMINIC 7 SCORE 12/19/2022 12/5/2022   DOMINIC-7 Total Score 21 21       Interpretation of Total Score. Anxiety Severity: Score 0-4: Minimal Anxiety. Score 5-9: Mild Anxiety. Score 10-14: Moderate Anxiety. Score greater than 15: Severe Anxiety. Diagnosis:    1. Mod[facility] episode [facility]    2. Anxiety        Past Medical History  No past medical history on file. Plan:  Pt interventions:  Discussed and set plan for behavioral activation, Discussed self-care (sleep, nutrition, rewarding activities, social support, exercise), Recommended limiting alcohol use or abstaining, Supportive techniques, and Provided Psychoeducation re: setting boundaries    Pt Behavioral Change Plan:   See Pt Instructions.

## 2022-12-21 DIAGNOSIS — R19.5 DARK STOOLS: ICD-10-CM

## 2022-12-21 DIAGNOSIS — K21.00 GASTROESOPHAGEAL REFLUX DISEASE WITH ESOPHAGITIS, UNSPECIFIED WHETHER HEMORRHAGE: ICD-10-CM

## 2022-12-21 DIAGNOSIS — G60.0 CHARCOT-MARIE-TOOTH DISEASE: ICD-10-CM

## 2022-12-21 RX ORDER — GABAPENTIN 300 MG/1
CAPSULE ORAL
Qty: 30 CAPSULE | OUTPATIENT
Start: 2022-12-21 | End: 2023-01-20

## 2022-12-21 RX ORDER — PANTOPRAZOLE SODIUM 40 MG/1
TABLET, DELAYED RELEASE ORAL
Qty: 60 TABLET | Refills: 0 | Status: SHIPPED | OUTPATIENT
Start: 2022-12-21 | End: 2023-01-18

## 2022-12-21 NOTE — TELEPHONE ENCOUNTER
Medication:   Requested Prescriptions     Pending Prescriptions Disp Refills    pantoprazole (PROTONIX) 40 MG tablet [Pharmacy Med Name: PANTOPRAZOLE SOD DR 40 MG TAB] 60 tablet 0     Sig: TAKE ONE TABLET BY MOUTH TWICE A DAY BEFORE MEALS    gabapentin (NEURONTIN) 300 MG capsule [Pharmacy Med Name: GABAPENTIN 300 MG CAPSULE] 30 capsule      Sig: TAKE ONE CAPSULE BY MOUTH DAILY        Last Filled:      Patient Phone Number: [phone] (home)     Last appt: 11/16/2022   Next appt: Visit date not found    Last OARRS: No flowsheet data found. Preferred Pharmacy:   [address], [address] [place] [address])  [address]  Phone: [phone] Fax: [phone]  Medication:   Requested Prescriptions     Pending Prescriptions Disp Refills    pantoprazole (PROTONIX) 40 MG tablet [Pharmacy Med Name: PANTOPRAZOLE SOD DR 40 MG TAB] 60 tablet 0     Sig: TAKE ONE TABLET BY MOUTH TWICE A DAY BEFORE MEALS    gabapentin (NEURONTIN) 300 MG capsule [Pharmacy Med Name: GABAPENTIN 300 MG CAPSULE] 30 capsule      Sig: TAKE ONE CAPSULE BY MOUTH DAILY        Last Filled:      Patient Phone Number: [phone] (home)     Last appt: 11/16/2022   Next appt: Visit date not found    Last OARRS: No flowsheet data found.     Preferred Pharmacy:   [address], [address] [place] [address])  [address] [address]  Phone: [phone] Fax: [phone]

## 2023-01-18 DIAGNOSIS — R19.5 DARK STOOLS: ICD-10-CM

## 2023-01-18 DIAGNOSIS — K21.00 GASTROESOPHAGEAL REFLUX DISEASE WITH ESOPHAGITIS, UNSPECIFIED WHETHER HEMORRHAGE: ICD-10-CM

## 2023-01-18 RX ORDER — PANTOPRAZOLE SODIUM 40 MG/1
TABLET, DELAYED RELEASE ORAL
Qty: 60 TABLET | Refills: 0 | Status: SHIPPED | OUTPATIENT
Start: 2023-01-18

## 2023-01-18 NOTE — TELEPHONE ENCOUNTER
Medication:   Requested Prescriptions     Pending Prescriptions Disp Refills    pantoprazole (PROTONIX) 40 MG tablet [Pharmacy Med Name: PANTOPRAZOLE SOD DR 40 MG TAB] 60 tablet 0     Sig: TAKE ONE TABLET BY MOUTH TWICE A DAY BEFORE MEALS     Last Filled:  12/21/2022    Last appt: 11/16/2022   Next appt: Visit date not found    Last Labs DM:   Lab Results   Component Value Date/Time    LABA1C 5.7 10/13/2022 02:02 PM     Last Lipid: No results found for: CHOL, TRIG, HDL, LDLCALC  Last PSA: No results found for: PSA  Last Thyroid: No results found for: TSH, FT3, Z6QNURG, T4FREE, K8RYZXD

## 2023-02-06 ENCOUNTER — OFFICE VISIT (OUTPATIENT)
Dept: PRIMARY CARE CLINIC | Age: 38
End: 2023-02-06
Payer: COMMERCIAL

## 2023-02-06 ENCOUNTER — PATIENT MESSAGE (OUTPATIENT)
Dept: PRIMARY CARE CLINIC | Age: 38
End: 2023-02-06

## 2023-02-06 VITALS
OXYGEN SATURATION: 99 % | DIASTOLIC BLOOD PRESSURE: 80 MMHG | HEART RATE: 78 BPM | SYSTOLIC BLOOD PRESSURE: 126 MMHG | TEMPERATURE: 98.4 F | BODY MASS INDEX: 34.86 KG/M2 | WEIGHT: 249 LBS | HEIGHT: 71 IN

## 2023-02-06 DIAGNOSIS — L23.9 ALLERGIC DERMATITIS: Primary | ICD-10-CM

## 2023-02-06 PROCEDURE — 3074F SYST BP LT 130 MM HG: CPT | Performed by: NURSE PRACTITIONER

## 2023-02-06 PROCEDURE — 99213 OFFICE O/P EST LOW 20 MIN: CPT | Performed by: NURSE PRACTITIONER

## 2023-02-06 PROCEDURE — 3079F DIAST BP 80-89 MM HG: CPT | Performed by: NURSE PRACTITIONER

## 2023-02-06 RX ORDER — METHYLPREDNISOLONE 4 MG/1
TABLET ORAL
Qty: 1 KIT | Refills: 0 | Status: SHIPPED | OUTPATIENT
Start: 2023-02-06

## 2023-02-06 SDOH — ECONOMIC STABILITY: FOOD INSECURITY: WITHIN THE PAST 12 MONTHS, THE FOOD YOU BOUGHT JUST DIDN'T LAST AND YOU DIDN'T HAVE MONEY TO GET MORE.: NEVER TRUE

## 2023-02-06 SDOH — ECONOMIC STABILITY: HOUSING INSECURITY
IN THE LAST 12 MONTHS, WAS THERE A TIME WHEN YOU DID NOT HAVE A STEADY PLACE TO SLEEP OR SLEPT IN A SHELTER (INCLUDING NOW)?: NO

## 2023-02-06 SDOH — ECONOMIC STABILITY: FOOD INSECURITY: WITHIN THE PAST 12 MONTHS, YOU WORRIED THAT YOUR FOOD WOULD RUN OUT BEFORE YOU GOT MONEY TO BUY MORE.: NEVER TRUE

## 2023-02-06 SDOH — ECONOMIC STABILITY: INCOME INSECURITY: HOW HARD IS IT FOR YOU TO PAY FOR THE VERY BASICS LIKE FOOD, HOUSING, MEDICAL CARE, AND HEATING?: NOT HARD AT ALL

## 2023-02-06 ASSESSMENT — ENCOUNTER SYMPTOMS
CHEST TIGHTNESS: 0
VOMITING: 0
RHINORRHEA: 1
TROUBLE SWALLOWING: 0
WHEEZING: 0
ABDOMINAL PAIN: 0
SINUS PAIN: 0
NAUSEA: 0
SINUS PRESSURE: 0
COUGH: 0
SHORTNESS OF BREATH: 0
SORE THROAT: 0
DIARRHEA: 0

## 2023-02-06 NOTE — PROGRESS NOTES
ENCOUNTER DATE: 2/6/2023     NAME: Ridge Romeo   AGE: 40 y.o. GENDER: male   YOB: 1985    Patient Active Problem List   Diagnosis    CMT (Charcot-Sariah-Tooth disease)    Benign essential HTN    Gastroesophageal reflux disease without esophagitis    Depression with anxiety      No Known Allergies  Current Outpatient Medications on File Prior to Visit   Medication Sig Dispense Refill    pantoprazole (PROTONIX) 40 MG tablet TAKE ONE TABLET BY MOUTH TWICE A DAY BEFORE MEALS 60 tablet 0    gabapentin (NEURONTIN) 300 MG capsule TAKE ONE CAPSULE BY MOUTH DAILY 90 capsule 1    metoprolol succinate (TOPROL XL) 50 MG extended release tablet TAKE ONE TABLET BY MOUTH DAILY 90 tablet 1    oxymetazoline (12 HOUR NASAL SPRAY) 0.05 % nasal spray Use one spray per nostril twice daily as needed for no more than 3 days. 15 mL 0    baclofen (LIORESAL) 10 MG tablet Take 10 mg by mouth as needed       No current facility-administered medications on file prior to visit. Social History     Tobacco Use    Smoking status: Never    Smokeless tobacco: Never   Substance Use Topics    Alcohol use: Yes     Alcohol/week: 2.0 standard drinks     Types: 2 Cans of beer per week     Comment: Occasional drinker      CARE TEAM  Patient Care Team:  Yonny Oliveira MD as PCP - General (Family Medicine)  Yonny Oliveira MD as PCP - Empaneled Provider    Chief Complaint   Patient presents with    Rash     On  legs  hand   itching   started  yesterday    Diarrhea    Nausea        HPI:   Patient is here for a problem visit    Complains of rash since yesterday  First noticed on right elbow, now spread to legs, chest, hands. Itches. Some places worse than others. Back of legs itch bad. No throat swelling or shortness of breath. No pain. No lesions on hands, feet. No known new exposures. Had \"GI bug\" over the wknd and has recovered. Some recent mild sinus congestion  Family had same symptoms over the wknd.  No one else has rash. No sore throat. No sores in mouth. Has runny nose. No fevers. Worse in the am and at night. Using nasal spray. ROS:  Review of Systems   Constitutional:  Negative for activity change, appetite change, chills, fatigue and fever. HENT:  Positive for rhinorrhea. Negative for congestion, ear pain, postnasal drip, sinus pressure, sinus pain, sore throat and trouble swallowing. Respiratory:  Negative for cough, chest tightness, shortness of breath and wheezing. Cardiovascular:  Negative for chest pain, palpitations and leg swelling. Gastrointestinal:  Negative for abdominal pain, diarrhea, nausea and vomiting. Genitourinary:  Negative for difficulty urinating. Musculoskeletal:  Negative for myalgias. Skin:  Positive for rash. Neurological:  Negative for dizziness, weakness, light-headedness and headaches. Hematological:  Negative for adenopathy. VITALS:  /80   Pulse 78   Temp 98.4 °F (36.9 °C) (Oral)   Ht 5' 11\" (1.803 m)   Wt 249 lb (112.9 kg)   SpO2 99%   BMI 34.73 kg/m²      PE:  Physical Exam  Vitals and nursing note reviewed. Constitutional:       General: He is not in acute distress. Appearance: Normal appearance. He is well-developed and normal weight. He is not diaphoretic. HENT:      Mouth/Throat:      Mouth: Mucous membranes are moist. No oral lesions. Tongue: No lesions. Palate: No lesions. Pharynx: Oropharynx is clear. No oropharyngeal exudate or posterior oropharyngeal erythema. Tonsils: No tonsillar exudate. Cardiovascular:      Rate and Rhythm: Normal rate and regular rhythm. Heart sounds: Normal heart sounds. No murmur heard. No friction rub. Pulmonary:      Effort: Pulmonary effort is normal. No respiratory distress. Breath sounds: Normal breath sounds. No wheezing, rhonchi or rales. Skin:     General: Skin is warm and dry. Findings: Rash present.  Rash is urticarial. Rash is not pustular, scaling or vesicular. Neurological:      Mental Status: He is alert and oriented to person, place, and time. Motor: No weakness. Gait: Gait normal.   Psychiatric:         Mood and Affect: Mood normal.         Behavior: Behavior normal.        ASSESSMENT/PLAN:  1. Allergic dermatitis  Start on oral steroids since rash is so widespread  May take benadryl at night and zyrtec/claritin during the day for itching  Will call if no improvement. - methylPREDNISolone (MEDROL, TIM,) 4 MG tablet; Take by mouth per package instruction  Dispense: 1 kit; Refill: 0      Return if symptoms worsen or fail to improve.      Electronically signed by Sedonia Lombard, APRN - CNP on 2/6/2023 at 1:35 PM

## 2023-02-10 NOTE — TELEPHONE ENCOUNTER
From: Javier Glynn  To: Dr. Zach Pearson: 2/6/2023 10:05 AM EST  Subject: Van Boone,     I have this rash through various spots on my body. Can you advise on what it might be and a treatment plan?      Thank you,   Foy Krabbe

## 2023-02-18 DIAGNOSIS — K21.00 GASTROESOPHAGEAL REFLUX DISEASE WITH ESOPHAGITIS, UNSPECIFIED WHETHER HEMORRHAGE: ICD-10-CM

## 2023-02-18 DIAGNOSIS — R19.5 DARK STOOLS: ICD-10-CM

## 2023-02-20 RX ORDER — PANTOPRAZOLE SODIUM 40 MG/1
TABLET, DELAYED RELEASE ORAL
Qty: 60 TABLET | Refills: 0 | Status: SHIPPED | OUTPATIENT
Start: 2023-02-20

## 2023-02-20 NOTE — TELEPHONE ENCOUNTER
Medication:   Requested Prescriptions     Pending Prescriptions Disp Refills    pantoprazole (PROTONIX) 40 MG tablet [Pharmacy Med Name: PANTOPRAZOLE SOD DR 40 MG TAB] 60 tablet 0     Sig: TAKE ONE TABLET BY MOUTH TWICE A DAY BEFORE MEALS     Last Filled:  1/18/23    Last appt: 10/13/2023   Next appt: Visit date not found    Last OARRS: No flowsheet data found.

## 2023-03-10 NOTE — TELEPHONE ENCOUNTER
Spouse called for the patient and requested to refill the following medication      baclofen (LIORESAL) 10 MG tablet       The pharmacy to send      [facility] [address] [facility], [address] [facility], [facility] [phone] Sophie Garcia [phone]    Please review    Thanks

## 2023-03-13 RX ORDER — BACLOFEN 10 MG/1
10 TABLET ORAL DAILY PRN
Qty: 90 TABLET | Refills: 0 | Status: SHIPPED | OUTPATIENT
Start: 2023-03-13

## 2023-03-21 DIAGNOSIS — R19.5 DARK STOOLS: ICD-10-CM

## 2023-03-21 DIAGNOSIS — K21.00 GASTROESOPHAGEAL REFLUX DISEASE WITH ESOPHAGITIS, UNSPECIFIED WHETHER HEMORRHAGE: ICD-10-CM

## 2023-03-22 RX ORDER — PANTOPRAZOLE SODIUM 40 MG/1
TABLET, DELAYED RELEASE ORAL
Qty: 60 TABLET | Refills: 4 | Status: SHIPPED | OUTPATIENT
Start: 2023-03-22

## 2023-04-19 DIAGNOSIS — G60.0 CHARCOT-MARIE-TOOTH DISEASE: ICD-10-CM

## 2023-04-19 RX ORDER — GABAPENTIN 300 MG/1
300 CAPSULE ORAL DAILY
Qty: 90 CAPSULE | Refills: 1 | Status: CANCELLED | OUTPATIENT
Start: 2023-04-19 | End: 2023-10-16

## 2023-04-19 RX ORDER — GABAPENTIN 300 MG/1
300 CAPSULE ORAL 3 TIMES DAILY
Qty: 270 CAPSULE | Refills: 1 | Status: SHIPPED | OUTPATIENT
Start: 2023-04-19 | End: 2023-10-16

## 2023-04-19 NOTE — TELEPHONE ENCOUNTER
Medication:   Requested Prescriptions     Pending Prescriptions Disp Refills    gabapentin (NEURONTIN) 300 MG capsule 90 capsule 1     Sig: Take 1 capsule by mouth daily for 30 days.      Last Filled:  12/12/22    Last appt: 2/6/2023   Next appt: Visit date not found    Last Labs DM:   Lab Results   Component Value Date/Time    LABA1C 5.7 10/13/2022 02:02 PM     Last Lipid: No results found for: CHOL, TRIG, HDL, LDLCALC  Last PSA: No results found for: PSA  Last Thyroid: No results found for: TSH, FT3, E9ENOTW, T4FREE, R9AWEZT

## 2023-06-30 DIAGNOSIS — I10 BENIGN ESSENTIAL HTN: ICD-10-CM

## 2023-06-30 RX ORDER — METOPROLOL SUCCINATE 50 MG/1
50 TABLET, EXTENDED RELEASE ORAL DAILY
Qty: 90 TABLET | Refills: 0 | Status: SHIPPED | OUTPATIENT
Start: 2023-06-30

## 2023-06-30 RX ORDER — METOPROLOL SUCCINATE 50 MG/1
TABLET, EXTENDED RELEASE ORAL
Qty: 30 TABLET | OUTPATIENT
Start: 2023-06-30

## 2023-06-30 NOTE — TELEPHONE ENCOUNTER
Medication:   Requested Prescriptions     Pending Prescriptions Disp Refills    metoprolol succinate (TOPROL XL) 50 MG extended release tablet [Pharmacy Med Name: METOPROLOL SUCC ER 50 MG TAB] 30 tablet      Sig: TAKE ONE TABLET BY MOUTH DAILY     Last Filled:  11.23.22    Last appt: 6.23.22  Next appt: Voicemail left to call and schedule appointment    Last OARRS: No flowsheet data found.

## 2023-06-30 NOTE — TELEPHONE ENCOUNTER
----- Message from Cherie Bolden sent at 6/30/2023  9:37 AM EDT -----  Subject: Refill Request    QUESTIONS  Name of Medication? metoprolol succinate (TOPROL XL) 50 MG extended   release tablet  Patient-reported dosage and instructions? 50mg QD  How many days do you have left? 0  Preferred Pharmacy? [address] [facility] [id number]  Pharmacy phone number (if available)? [phone]  Additional Information for Provider? Next appt 07/21  ---------------------------------------------------------------------------  --------------  CALL BACK INFO  What is the best way for the office to contact you? OK to leave message on   voicemail  Preferred Call Back Phone Number? [phone]  ---------------------------------------------------------------------------  --------------  SCRIPT ANSWERS  Relationship to Patient? Spouse/Partner  Representative Name? Praveen Patel  Is the representative on the Communication Release of Information (BONNIE)   form in Epic?  Yes

## 2023-07-21 ENCOUNTER — PATIENT MESSAGE (OUTPATIENT)
Dept: PRIMARY CARE CLINIC | Age: 38
End: 2023-07-21

## 2023-07-21 ENCOUNTER — OFFICE VISIT (OUTPATIENT)
Dept: PRIMARY CARE CLINIC | Age: 38
End: 2023-07-21
Payer: COMMERCIAL

## 2023-07-21 VITALS
BODY MASS INDEX: 35.15 KG/M2 | SYSTOLIC BLOOD PRESSURE: 122 MMHG | HEART RATE: 88 BPM | OXYGEN SATURATION: 98 % | DIASTOLIC BLOOD PRESSURE: 70 MMHG | WEIGHT: 252 LBS | TEMPERATURE: 97.9 F

## 2023-07-21 DIAGNOSIS — R20.2 NUMBNESS AND TINGLING IN BOTH HANDS: ICD-10-CM

## 2023-07-21 DIAGNOSIS — R20.0 NUMBNESS AND TINGLING OF BOTH LOWER EXTREMITIES: ICD-10-CM

## 2023-07-21 DIAGNOSIS — R20.0 NUMBNESS AND TINGLING IN BOTH HANDS: ICD-10-CM

## 2023-07-21 DIAGNOSIS — R20.2 NUMBNESS AND TINGLING OF BOTH LOWER EXTREMITIES: ICD-10-CM

## 2023-07-21 DIAGNOSIS — G60.0 CHARCOT-MARIE-TOOTH DISEASE: Primary | ICD-10-CM

## 2023-07-21 DIAGNOSIS — M54.12 CERVICAL RADICULOPATHY: ICD-10-CM

## 2023-07-21 DIAGNOSIS — R21 RASH: Primary | ICD-10-CM

## 2023-07-21 LAB
ALBUMIN SERPL-MCNC: 4.5 G/DL (ref 3.4–5)
ALBUMIN/GLOB SERPL: 1.5 {RATIO} (ref 1.1–2.2)
ALP SERPL-CCNC: 82 U/L (ref 40–129)
ALT SERPL-CCNC: 88 U/L (ref 10–40)
ANION GAP SERPL CALCULATED.3IONS-SCNC: 13 MMOL/L (ref 3–16)
AST SERPL-CCNC: 39 U/L (ref 15–37)
BILIRUB SERPL-MCNC: 0.4 MG/DL (ref 0–1)
BUN SERPL-MCNC: 11 MG/DL (ref 7–20)
CALCIUM SERPL-MCNC: 9.8 MG/DL (ref 8.3–10.6)
CHLORIDE SERPL-SCNC: 104 MMOL/L (ref 99–110)
CO2 SERPL-SCNC: 24 MMOL/L (ref 21–32)
CREAT SERPL-MCNC: 0.5 MG/DL (ref 0.9–1.3)
EST. AVERAGE GLUCOSE BLD GHB EST-MCNC: 111.2 MG/DL
FOLATE SERPL-MCNC: >20 NG/ML (ref 4.78–24.2)
GFR SERPLBLD CREATININE-BSD FMLA CKD-EPI: >60 ML/MIN/{1.73_M2}
GLUCOSE SERPL-MCNC: 105 MG/DL (ref 70–99)
HBA1C MFR BLD: 5.5 %
POTASSIUM SERPL-SCNC: 4.3 MMOL/L (ref 3.5–5.1)
PROT SERPL-MCNC: 7.5 G/DL (ref 6.4–8.2)
SODIUM SERPL-SCNC: 141 MMOL/L (ref 136–145)
TSH SERPL DL<=0.005 MIU/L-ACNC: 1.45 UIU/ML (ref 0.27–4.2)
VIT B12 SERPL-MCNC: 936 PG/ML (ref 211–911)

## 2023-07-21 PROCEDURE — 3078F DIAST BP <80 MM HG: CPT | Performed by: STUDENT IN AN ORGANIZED HEALTH CARE EDUCATION/TRAINING PROGRAM

## 2023-07-21 PROCEDURE — 99214 OFFICE O/P EST MOD 30 MIN: CPT | Performed by: STUDENT IN AN ORGANIZED HEALTH CARE EDUCATION/TRAINING PROGRAM

## 2023-07-21 PROCEDURE — 3074F SYST BP LT 130 MM HG: CPT | Performed by: STUDENT IN AN ORGANIZED HEALTH CARE EDUCATION/TRAINING PROGRAM

## 2023-07-21 ASSESSMENT — PATIENT HEALTH QUESTIONNAIRE - PHQ9
6. FEELING BAD ABOUT YOURSELF - OR THAT YOU ARE A FAILURE OR HAVE LET YOURSELF OR YOUR FAMILY DOWN: 3
1. LITTLE INTEREST OR PLEASURE IN DOING THINGS: NOT AT ALL
1. LITTLE INTEREST OR PLEASURE IN DOING THINGS: 0
SUM OF ALL RESPONSES TO PHQ QUESTIONS 1-9: 14
7. TROUBLE CONCENTRATING ON THINGS, SUCH AS READING THE NEWSPAPER OR WATCHING TELEVISION: 2
6. FEELING BAD ABOUT YOURSELF - OR THAT YOU ARE A FAILURE OR HAVE LET YOURSELF OR YOUR FAMILY DOWN: NEARLY EVERY DAY
SUM OF ALL RESPONSES TO PHQ QUESTIONS 1-9: 14
5. POOR APPETITE OR OVEREATING: NEARLY EVERY DAY
8. MOVING OR SPEAKING SO SLOWLY THAT OTHER PEOPLE COULD HAVE NOTICED. OR THE OPPOSITE, BEING SO FIGETY OR RESTLESS THAT YOU HAVE BEEN MOVING AROUND A LOT MORE THAN USUAL: 0
5. POOR APPETITE OR OVEREATING: 3
7. TROUBLE CONCENTRATING ON THINGS, SUCH AS READING THE NEWSPAPER OR WATCHING TELEVISION: MORE THAN HALF THE DAYS
10. IF YOU CHECKED OFF ANY PROBLEMS, HOW DIFFICULT HAVE THESE PROBLEMS MADE IT FOR YOU TO DO YOUR WORK, TAKE CARE OF THINGS AT HOME, OR GET ALONG WITH OTHER PEOPLE: 2
9. THOUGHTS THAT YOU WOULD BE BETTER OFF DEAD, OR OF HURTING YOURSELF: NOT AT ALL
2. FEELING DOWN, DEPRESSED OR HOPELESS: NOT AT ALL
SUM OF ALL RESPONSES TO PHQ9 QUESTIONS 1 & 2: 0
9. THOUGHTS THAT YOU WOULD BE BETTER OFF DEAD, OR OF HURTING YOURSELF: 0
4. FEELING TIRED OR HAVING LITTLE ENERGY: NEARLY EVERY DAY
SUM OF ALL RESPONSES TO PHQ QUESTIONS 1-9: 14
8. MOVING OR SPEAKING SO SLOWLY THAT OTHER PEOPLE COULD HAVE NOTICED. OR THE OPPOSITE - BEING SO FIDGETY OR RESTLESS THAT YOU HAVE BEEN MOVING AROUND A LOT MORE THAN USUAL: NOT AT ALL
SUM OF ALL RESPONSES TO PHQ QUESTIONS 1-9: 14
SUM OF ALL RESPONSES TO PHQ QUESTIONS 1-9: 14
3. TROUBLE FALLING OR STAYING ASLEEP: 3
3. TROUBLE FALLING OR STAYING ASLEEP: NEARLY EVERY DAY
10. IF YOU CHECKED OFF ANY PROBLEMS, HOW DIFFICULT HAVE THESE PROBLEMS MADE IT FOR YOU TO DO YOUR WORK, TAKE CARE OF THINGS AT HOME, OR GET ALONG WITH OTHER PEOPLE: VERY DIFFICULT
2. FEELING DOWN, DEPRESSED OR HOPELESS: 0
4. FEELING TIRED OR HAVING LITTLE ENERGY: 3

## 2023-07-21 NOTE — PROGRESS NOTES
Normocephalic and atraumatic. Right Ear: External ear normal.      Left Ear: External ear normal.      Nose: Nose normal.      Mouth/Throat:      Mouth: Mucous membranes are moist.   Eyes:      Extraocular Movements: Extraocular movements intact. Neck:      Comments: Tender in paraspinal cervical region  Positive Spurling's  Pain in posterior left infrascapular region  Some mild TTP lateral epicondyle    5/5  strength bilaterally  4/5 plantar flexion and extension right foot  5/5 plantarflexion and extension left foot    Cardiovascular:      Rate and Rhythm: Normal rate and regular rhythm. Heart sounds: Normal heart sounds. No murmur heard. No friction rub. No gallop. Pulmonary:      Effort: Pulmonary effort is normal.      Breath sounds: Normal breath sounds. No wheezing, rhonchi or rales. Musculoskeletal:      Cervical back: Normal range of motion. Skin:     General: Skin is warm. Neurological:      General: No focal deficit present. Mental Status: He is alert. Psychiatric:         Mood and Affect: Mood normal.                An electronic signature was used to authenticate this note.     --Sruthi Varner MD

## 2023-07-21 NOTE — PATIENT INSTRUCTIONS
I do not know who is covered under your insurance but here are a few resources you can try to get an appointment. [email or web address]. com  Go to website to schedule, they have online and In-person visits at multiple locations    Psychology Today  Go to website to look for a therapist  Joanna.Consumer Agent Portal (CAP). com/us/therapists/oh/[facility]  [email or web address]  [address] [facility]. [place], [address] [place], [place] [phone]    [facility] [address] [facility] Counseling and [address]  [phone]  www. [email or web address]  [email or web address]

## 2023-07-25 NOTE — TELEPHONE ENCOUNTER
From: Dr. Sruthi Varner  To: Valarie Zapien  Sent: 7/21/2023 10:06 AM EDT  Subject: Zahra Shields Visit    Here is your referral to dermatology  Just call to set up appointment  Scheduling Instructions: Dermatologists of [address] [place] [address], [address] [place] [address]  [phone]    Here are recommendations for therapy  I do not know who is covered under your insurance but here are a few resources you can try to get an appointment. [employer]. com  Go to website to schedule, they have online and In-person visits at multiple locations    Psychology Today  Go to website to look for a therapist  [email or web address].Style Jukebox. Jolicloud/us/therapists/oh/[facility]  [email or web address]  [address] [facility], [address] [place] [phone]    [facility] [address] [place] [address] Counseling and [address] [place] [address] [address]  [phone]  [email or web address]  [email or web address]      Also to forget to message me the physical therapy name and phone number that way I can send the physical therapy referral for your neck.     Sincerely,    Dr Shantell Temple

## 2023-09-15 ENCOUNTER — TELEMEDICINE (OUTPATIENT)
Dept: PSYCHOLOGY | Age: 38
End: 2023-09-15
Payer: COMMERCIAL

## 2023-09-15 DIAGNOSIS — F33.9 EPISODE OF RECURRENT MAJOR DEPRESSIVE DISORDER, UNSPECIFIED DEPRESSION EPISODE SEVERITY (HCC): Primary | ICD-10-CM

## 2023-09-15 DIAGNOSIS — F41.9 ANXIETY: ICD-10-CM

## 2023-09-15 PROCEDURE — 90832 PSYTX W PT 30 MINUTES: CPT

## 2023-09-15 NOTE — PROGRESS NOTES
beer per week     Comment: Occasional drinker    Drug use: Never    Sexual activity: Not on file   Other Topics Concern    Not on file   Social History Narrative    Not on file     Social Determinants of Health     Financial Resource Strain: Low Risk  (2/6/2023)    Overall Financial Resource Strain (CARDIA)     Difficulty of Paying Living Expenses: Not hard at all   Food Insecurity: No Food Insecurity (2/6/2023)    Hunger Vital Sign     Worried About Running Out of Food in the Last Year: Never true     [address] in the Last Year: Never true   Transportation Needs: Unknown (2/6/2023)    PRAPARE - Transportation     Lack of Transportation (Medical): Not on file     Lack of Transportation (Non-Medical): No   Physical Activity: Not on file   Stress: Not on file   Social Connections: Not on file   Intimate Partner Violence: Not on file   Housing Stability: Unknown (2/6/2023)    Housing Stability Vital Sign     Unable to Pay for Housing in the Last Year: Not on file     Number of Places Lived in the Last Year: Not on file     Unstable Housing in the Last Year: No     TOBACCO:   reports that he has never smoked. He has never used smokeless tobacco.  ETOH:   reports current alcohol use of about 2.0 standard drinks of alcohol per week. Family History:   No family history on file. A:  Did not readminister screeners this visit. Pt denied suicidal ideation. Insight and motivation are good. He is engaged and cooperative.         7/21/2023     6:28 AM 12/19/2022     9:35 AM 12/5/2022     9:53 AM 6/23/2022     9:53 AM   PHQ-9 Questionaire   Little interest or pleasure in doing things 0 1 3 0   Feeling down, depressed, or hopeless 0 2 3 0   Trouble falling or staying asleep, or sleeping too much 3 2 3    Feeling tired or having little energy 3 3 3    Poor appetite or overeating 3 3 3    Feeling bad about yourself - or that you are a failure or have let yourself or your family down 3 3 3    Trouble concentrating on things,

## 2023-09-15 NOTE — PATIENT INSTRUCTIONS
([phone])  National Suicide Prevention Lifeline    (285) 575-HDZX (8347)  www.suicidepreventionlifeline. org    Suicide and Crisis Lifeline  Dial [address]    (379) Ravin ([phone])  Www.ClicData.     [address] [facility] (PES): [address].    [address], [facility] Serene Aldridge    [phone] and Press 1  Text [phone]  www. veteranscrisisline. net

## 2023-09-27 DIAGNOSIS — I10 BENIGN ESSENTIAL HTN: ICD-10-CM

## 2023-09-27 RX ORDER — METOPROLOL SUCCINATE 50 MG/1
50 TABLET, EXTENDED RELEASE ORAL DAILY
Qty: 30 TABLET | Refills: 0 | Status: SHIPPED | OUTPATIENT
Start: 2023-09-27

## 2023-09-27 NOTE — TELEPHONE ENCOUNTER
Medication:   Requested Prescriptions     Pending Prescriptions Disp Refills    metoprolol succinate (TOPROL XL) 50 MG extended release tablet [Pharmacy Med Name: METOPROLOL SUCC ER 50 MG TAB] 30 tablet      Sig: TAKE 1 TABLET BY MOUTH DAILY     Last Filled:  6/30/23    Last appt: 7/21/2023   Next appt: Visit date not found    Last Labs DM:   Lab Results   Component Value Date/Time    LABA1C 5.5 07/21/2023 08:43 AM     Last Lipid: No results found for: \"CHOL\", \"TRIG\", \"HDL\", \"LDLCALC\"  Last PSA: No results found for: \"PSA\"  Last Thyroid: No results found for: \"TSH\", \"FT3\", \"T6KDEJU\", \"T4FREE\", \"P3USCLJ\"

## 2023-10-06 DIAGNOSIS — K21.00 GASTROESOPHAGEAL REFLUX DISEASE WITH ESOPHAGITIS, UNSPECIFIED WHETHER HEMORRHAGE: ICD-10-CM

## 2023-10-06 DIAGNOSIS — R19.5 DARK STOOLS: ICD-10-CM

## 2023-10-06 RX ORDER — PANTOPRAZOLE SODIUM 40 MG/1
TABLET, DELAYED RELEASE ORAL
Qty: 60 TABLET | Refills: 2 | Status: SHIPPED | OUTPATIENT
Start: 2023-10-06

## 2023-10-06 NOTE — TELEPHONE ENCOUNTER
Medication:   Requested Prescriptions     Pending Prescriptions Disp Refills    pantoprazole (PROTONIX) 40 MG tablet [Pharmacy Med Name: PANTOPRAZOLE SOD DR 40 MG TAB] 60 tablet 0     Sig: TAKE ONE TABLET BY MOUTH TWICE A DAY BEFORE MEALS     Last Filled:  3/22/2023    Last appt: 7/21/2023   Next appt: Visit date not found    Last OARRS:        No data to display

## 2023-10-31 ENCOUNTER — OFFICE VISIT (OUTPATIENT)
Dept: PRIMARY CARE CLINIC | Age: 38
End: 2023-10-31
Payer: COMMERCIAL

## 2023-10-31 VITALS — BODY MASS INDEX: 35.42 KG/M2 | WEIGHT: 253 LBS | TEMPERATURE: 96.9 F | RESPIRATION RATE: 16 BRPM | HEIGHT: 71 IN

## 2023-10-31 DIAGNOSIS — B08.4 HAND, FOOT AND MOUTH DISEASE: Primary | ICD-10-CM

## 2023-10-31 DIAGNOSIS — I10 BENIGN ESSENTIAL HTN: ICD-10-CM

## 2023-10-31 PROCEDURE — 99214 OFFICE O/P EST MOD 30 MIN: CPT | Performed by: STUDENT IN AN ORGANIZED HEALTH CARE EDUCATION/TRAINING PROGRAM

## 2023-10-31 RX ORDER — LIDOCAINE HYDROCHLORIDE 20 MG/ML
5 SOLUTION OROPHARYNGEAL
Qty: 100 ML | Refills: 0 | Status: SHIPPED | OUTPATIENT
Start: 2023-10-31

## 2023-10-31 RX ORDER — METOPROLOL SUCCINATE 50 MG/1
50 TABLET, EXTENDED RELEASE ORAL DAILY
Qty: 90 TABLET | Refills: 1 | Status: SHIPPED | OUTPATIENT
Start: 2023-10-31 | End: 2024-04-28

## 2024-01-03 DIAGNOSIS — R19.5 DARK STOOLS: ICD-10-CM

## 2024-01-03 DIAGNOSIS — K21.00 GASTROESOPHAGEAL REFLUX DISEASE WITH ESOPHAGITIS, UNSPECIFIED WHETHER HEMORRHAGE: ICD-10-CM

## 2024-01-03 RX ORDER — PANTOPRAZOLE SODIUM 40 MG/1
TABLET, DELAYED RELEASE ORAL
Qty: 180 TABLET | Refills: 2 | Status: SHIPPED | OUTPATIENT
Start: 2024-01-03

## 2024-01-03 NOTE — TELEPHONE ENCOUNTER
Medication:   Requested Prescriptions     Pending Prescriptions Disp Refills    pantoprazole (PROTONIX) 40 MG tablet 60 tablet 2     Last Filled:  10.6.23    Last appt: 10/31/2023   Next appt: Visit date not found    Last OARRS:        No data to display

## 2024-04-16 NOTE — TELEPHONE ENCOUNTER
Chief Complaint   Patient presents with    Diabetes     Blood pressure 131/73, pulse 76, weight 86.6 kg (191 lb), SpO2 97%.    Bailee Gregory     Pt called our  and tried making an appt with Dr Lavell Huff and was told he needed a referral.  He does not need referral for this, please call pt back to schedule him with Dr Lavell Huff for Depression and anxiety

## 2024-05-13 DIAGNOSIS — I10 BENIGN ESSENTIAL HTN: ICD-10-CM

## 2024-05-13 RX ORDER — METOPROLOL SUCCINATE 50 MG/1
50 TABLET, EXTENDED RELEASE ORAL DAILY
Qty: 90 TABLET | Refills: 0 | Status: SHIPPED | OUTPATIENT
Start: 2024-05-13 | End: 2024-11-09

## 2024-05-13 NOTE — TELEPHONE ENCOUNTER
Medication:   Requested Prescriptions     Pending Prescriptions Disp Refills    metoprolol succinate (TOPROL XL) 50 MG extended release tablet [Pharmacy Med Name: METOPROLOL SUCC ER 50 MG TAB] 30 tablet      Sig: TAKE 1 TABLET BY MOUTH DAILY     Last Filled:  10.31.24    Last appt: 10/31/2023   Next appt: Visit date not found    Last OARRS:        No data to display

## 2024-08-29 ENCOUNTER — OFFICE VISIT (OUTPATIENT)
Dept: PRIMARY CARE CLINIC | Age: 39
End: 2024-08-29
Payer: COMMERCIAL

## 2024-08-29 VITALS
WEIGHT: 254.03 LBS | DIASTOLIC BLOOD PRESSURE: 82 MMHG | OXYGEN SATURATION: 96 % | BODY MASS INDEX: 35.43 KG/M2 | SYSTOLIC BLOOD PRESSURE: 118 MMHG | TEMPERATURE: 97.1 F | HEART RATE: 75 BPM

## 2024-08-29 DIAGNOSIS — G89.29 CHRONIC INTRACTABLE HEADACHE, UNSPECIFIED HEADACHE TYPE: ICD-10-CM

## 2024-08-29 DIAGNOSIS — M62.838 TRAPEZIUS MUSCLE SPASM: Primary | ICD-10-CM

## 2024-08-29 DIAGNOSIS — R51.9 CHRONIC INTRACTABLE HEADACHE, UNSPECIFIED HEADACHE TYPE: ICD-10-CM

## 2024-08-29 PROCEDURE — 3074F SYST BP LT 130 MM HG: CPT | Performed by: STUDENT IN AN ORGANIZED HEALTH CARE EDUCATION/TRAINING PROGRAM

## 2024-08-29 PROCEDURE — 99214 OFFICE O/P EST MOD 30 MIN: CPT | Performed by: STUDENT IN AN ORGANIZED HEALTH CARE EDUCATION/TRAINING PROGRAM

## 2024-08-29 PROCEDURE — 3079F DIAST BP 80-89 MM HG: CPT | Performed by: STUDENT IN AN ORGANIZED HEALTH CARE EDUCATION/TRAINING PROGRAM

## 2024-08-29 RX ORDER — LIDOCAINE 4 G/G
1 PATCH TOPICAL DAILY
Qty: 30 PATCH | Refills: 0 | Status: SHIPPED | OUTPATIENT
Start: 2024-08-29 | End: 2024-09-28

## 2024-08-29 RX ORDER — LORATADINE 10 MG/1
10 TABLET ORAL DAILY
COMMUNITY

## 2024-08-29 SDOH — ECONOMIC STABILITY: FOOD INSECURITY: WITHIN THE PAST 12 MONTHS, THE FOOD YOU BOUGHT JUST DIDN'T LAST AND YOU DIDN'T HAVE MONEY TO GET MORE.: NEVER TRUE

## 2024-08-29 SDOH — ECONOMIC STABILITY: FOOD INSECURITY: WITHIN THE PAST 12 MONTHS, YOU WORRIED THAT YOUR FOOD WOULD RUN OUT BEFORE YOU GOT MONEY TO BUY MORE.: NEVER TRUE

## 2024-08-29 SDOH — ECONOMIC STABILITY: INCOME INSECURITY: HOW HARD IS IT FOR YOU TO PAY FOR THE VERY BASICS LIKE FOOD, HOUSING, MEDICAL CARE, AND HEATING?: NOT HARD AT ALL

## 2024-08-29 ASSESSMENT — PATIENT HEALTH QUESTIONNAIRE - PHQ9
10. IF YOU CHECKED OFF ANY PROBLEMS, HOW DIFFICULT HAVE THESE PROBLEMS MADE IT FOR YOU TO DO YOUR WORK, TAKE CARE OF THINGS AT HOME, OR GET ALONG WITH OTHER PEOPLE: NOT DIFFICULT AT ALL
3. TROUBLE FALLING OR STAYING ASLEEP: NOT AT ALL
2. FEELING DOWN, DEPRESSED OR HOPELESS: NOT AT ALL
4. FEELING TIRED OR HAVING LITTLE ENERGY: NOT AT ALL
SUM OF ALL RESPONSES TO PHQ9 QUESTIONS 1 & 2: 0
1. LITTLE INTEREST OR PLEASURE IN DOING THINGS: NOT AT ALL
5. POOR APPETITE OR OVEREATING: NOT AT ALL
8. MOVING OR SPEAKING SO SLOWLY THAT OTHER PEOPLE COULD HAVE NOTICED. OR THE OPPOSITE, BEING SO FIGETY OR RESTLESS THAT YOU HAVE BEEN MOVING AROUND A LOT MORE THAN USUAL: NOT AT ALL
SUM OF ALL RESPONSES TO PHQ QUESTIONS 1-9: 0
7. TROUBLE CONCENTRATING ON THINGS, SUCH AS READING THE NEWSPAPER OR WATCHING TELEVISION: NOT AT ALL
SUM OF ALL RESPONSES TO PHQ QUESTIONS 1-9: 0
9. THOUGHTS THAT YOU WOULD BE BETTER OFF DEAD, OR OF HURTING YOURSELF: NOT AT ALL
SUM OF ALL RESPONSES TO PHQ QUESTIONS 1-9: 0
6. FEELING BAD ABOUT YOURSELF - OR THAT YOU ARE A FAILURE OR HAVE LET YOURSELF OR YOUR FAMILY DOWN: NOT AT ALL
SUM OF ALL RESPONSES TO PHQ QUESTIONS 1-9: 0

## 2024-08-29 NOTE — PROGRESS NOTES
Nando [place] (:  1985) is a 38 y.o. male,Established patient, here for evaluation of the following chief complaint(s):  Headache (For 2 weeks ), Neck Pain, Allergies, and Sinus Problem         Assessment & Plan  Trapezius muscle spasm     Has done physical therapy and no improvement of symptoms  Can try diclofenac  If no improvement in symptoms next couple weeks will get x-ray of his neck  Orders:    diclofenac (VOLTAREN) 50 MG EC tablet; Take 1 tablet by mouth 2 times daily as needed for Pain    lidocaine 4 % external patch; Place 1 patch onto the skin daily    Chronic intractable headache, unspecified headache type     Diclofenac as needed, increase fluids  Could be due to trapezius muscle spasm  He is already on muscle relaxers as well as gabapentin  If no improvement in his symptoms or significantly worsening over the next couple of weeks then would recommend imaging at that time.  MRI brain without contrast         Return if symptoms worsen or fail to improve.       Subjective   HPI    Sinus congestion and then left side temporal HA with some left sided neck pain and upper shoulder pain.   No dizziness or lightheaded, no sick contacts, some sensitivity to light and sound a little. Over the past 2 months HA's has been getting worse now. No night time awakenings    His neurologist at  in May did EMG since he was having left hand pain   Review of Systems   All other systems reviewed and are negative.         Objective   Physical Exam  Vitals reviewed.   Constitutional:       General: He is not in acute distress.     Appearance: Normal appearance. He is not ill-appearing, toxic-appearing or diaphoretic.   HENT:      Head: Normocephalic and atraumatic.      Right Ear: External ear normal.      Left Ear: External ear normal.      Nose: Nose normal.      Mouth/Throat:      Mouth: Mucous membranes are moist.   Eyes:      Extraocular Movements: Extraocular movements intact.   Cardiovascular:      Rate  and Rhythm: Normal rate and regular rhythm.      Heart sounds: Normal heart sounds. No murmur heard.     No friction rub. No gallop.   Pulmonary:      Effort: Pulmonary effort is normal.      Breath sounds: Normal breath sounds. No wheezing, rhonchi or rales.   Musculoskeletal:      Cervical back: Normal range of motion.      Comments: TTP left parietal region  No TMJ  Moves all extremities well   Skin:     General: Skin is warm.   Neurological:      General: No focal deficit present.      Mental Status: He is alert.   Psychiatric:         Mood and Affect: Mood normal.                  An electronic signature was used to authenticate this note.    --Lauren Blair MD

## 2024-09-03 DIAGNOSIS — I10 BENIGN ESSENTIAL HTN: ICD-10-CM

## 2024-09-03 RX ORDER — METOPROLOL SUCCINATE 50 MG/1
50 TABLET, EXTENDED RELEASE ORAL DAILY
Qty: 90 TABLET | Refills: 0 | Status: SHIPPED | OUTPATIENT
Start: 2024-09-03 | End: 2025-03-02

## 2024-09-03 NOTE — TELEPHONE ENCOUNTER
Medication:   Requested Prescriptions     Pending Prescriptions Disp Refills    metoprolol succinate (TOPROL XL) 50 MG extended release tablet 90 tablet 0     Sig: Take 1 tablet by mouth daily     Last Filled:  5/13/2024    Last appt: 8/29/2024   Next appt: Visit date not found    Last OARRS:        No data to display

## 2024-10-22 DIAGNOSIS — K21.00 GASTROESOPHAGEAL REFLUX DISEASE WITH ESOPHAGITIS, UNSPECIFIED WHETHER HEMORRHAGE: ICD-10-CM

## 2024-10-22 DIAGNOSIS — R19.5 DARK STOOLS: ICD-10-CM

## 2024-10-22 RX ORDER — PANTOPRAZOLE SODIUM 40 MG/1
TABLET, DELAYED RELEASE ORAL
Qty: 180 TABLET | Refills: 1 | Status: SHIPPED | OUTPATIENT
Start: 2024-10-22

## 2024-10-22 NOTE — TELEPHONE ENCOUNTER
Medication:   Requested Prescriptions     Pending Prescriptions Disp Refills    pantoprazole (PROTONIX) 40 MG tablet [Pharmacy Med Name: PANTOPRAZOLE SOD DR 40 MG TAB] 60 tablet      Sig: TAKE 1 TABLET BY MOUTH TWICE A DAY BEFORE MEALS     Last filled: 1/3/24  Last appt: 8/29/2024   Next appt: Visit date not found    Last OARRS:        No data to display

## 2024-12-12 DIAGNOSIS — I10 BENIGN ESSENTIAL HTN: ICD-10-CM

## 2024-12-12 RX ORDER — METOPROLOL SUCCINATE 50 MG/1
50 TABLET, EXTENDED RELEASE ORAL DAILY
Qty: 90 TABLET | Refills: 1 | Status: SHIPPED | OUTPATIENT
Start: 2024-12-12 | End: 2025-06-10

## 2024-12-12 NOTE — TELEPHONE ENCOUNTER
Medication:   Requested Prescriptions     Pending Prescriptions Disp Refills    metoprolol succinate (TOPROL XL) 50 MG extended release tablet [Pharmacy Med Name: METOPROLOL SUCC ER 50 MG TAB] 30 tablet      Sig: TAKE 1 TABLET BY MOUTH DAILY     Last Filled:  9/3/2024    Last appt: 8/29/2024   Next appt: Visit date not found    Last OARRS:        No data to display

## 2024-12-16 ENCOUNTER — HOSPITAL ENCOUNTER (OUTPATIENT)
Dept: GENERAL RADIOLOGY | Age: 39
Discharge: HOME | End: 2024-12-16
Payer: COMMERCIAL

## 2024-12-16 ENCOUNTER — OFFICE VISIT (OUTPATIENT)
Dept: PRIMARY CARE CLINIC | Age: 39
End: 2024-12-16
Payer: COMMERCIAL

## 2024-12-16 VITALS
HEIGHT: 71 IN | TEMPERATURE: 97.3 F | WEIGHT: 258.13 LBS | OXYGEN SATURATION: 97 % | HEART RATE: 91 BPM | BODY MASS INDEX: 36.14 KG/M2 | DIASTOLIC BLOOD PRESSURE: 86 MMHG | SYSTOLIC BLOOD PRESSURE: 130 MMHG

## 2024-12-16 DIAGNOSIS — R68.2 DRY MOUTH: ICD-10-CM

## 2024-12-16 DIAGNOSIS — G89.29 CHRONIC MIDLINE LOW BACK PAIN WITHOUT SCIATICA: ICD-10-CM

## 2024-12-16 DIAGNOSIS — M54.50 CHRONIC MIDLINE LOW BACK PAIN WITHOUT SCIATICA: ICD-10-CM

## 2024-12-16 DIAGNOSIS — J01.90 ACUTE NON-RECURRENT SINUSITIS, UNSPECIFIED LOCATION: Primary | ICD-10-CM

## 2024-12-16 DIAGNOSIS — G89.29 CHRONIC NECK PAIN: ICD-10-CM

## 2024-12-16 DIAGNOSIS — R68.89 FLU-LIKE SYMPTOMS: ICD-10-CM

## 2024-12-16 DIAGNOSIS — M54.2 CHRONIC NECK PAIN: ICD-10-CM

## 2024-12-16 LAB
CRP SERPL-MCNC: 31.1 MG/L (ref 0–5.1)
ERYTHROCYTE [SEDIMENTATION RATE] IN BLOOD BY WESTERGREN METHOD: 42 MM/HR (ref 0–15)
INFLUENZA A ANTIBODY: NEGATIVE
INFLUENZA B ANTIBODY: NEGATIVE
Lab: 0
QC PASS/FAIL: NORMAL
RHEUMATOID FACT SER IA-ACNC: <10 IU/ML
SARS-COV-2 RDRP RESP QL NAA+PROBE: NEGATIVE
STREPTOCOCCUS A RNA: NEGATIVE

## 2024-12-16 PROCEDURE — 3079F DIAST BP 80-89 MM HG: CPT | Performed by: STUDENT IN AN ORGANIZED HEALTH CARE EDUCATION/TRAINING PROGRAM

## 2024-12-16 PROCEDURE — 72100 X-RAY EXAM L-S SPINE 2/3 VWS: CPT

## 2024-12-16 PROCEDURE — 99214 OFFICE O/P EST MOD 30 MIN: CPT | Performed by: STUDENT IN AN ORGANIZED HEALTH CARE EDUCATION/TRAINING PROGRAM

## 2024-12-16 PROCEDURE — 3075F SYST BP GE 130 - 139MM HG: CPT | Performed by: STUDENT IN AN ORGANIZED HEALTH CARE EDUCATION/TRAINING PROGRAM

## 2024-12-16 PROCEDURE — 87635 SARS-COV-2 COVID-19 AMP PRB: CPT | Performed by: STUDENT IN AN ORGANIZED HEALTH CARE EDUCATION/TRAINING PROGRAM

## 2024-12-16 PROCEDURE — 87651 STREP A DNA AMP PROBE: CPT | Performed by: STUDENT IN AN ORGANIZED HEALTH CARE EDUCATION/TRAINING PROGRAM

## 2024-12-16 PROCEDURE — 72040 X-RAY EXAM NECK SPINE 2-3 VW: CPT

## 2024-12-16 PROCEDURE — 87804 INFLUENZA ASSAY W/OPTIC: CPT | Performed by: STUDENT IN AN ORGANIZED HEALTH CARE EDUCATION/TRAINING PROGRAM

## 2024-12-16 RX ORDER — METHYLPREDNISOLONE 4 MG/1
TABLET ORAL
Qty: 1 KIT | Refills: 0 | Status: SHIPPED | OUTPATIENT
Start: 2024-12-16 | End: 2024-12-22

## 2024-12-16 NOTE — PROGRESS NOTES
sleeping and waking up frequently,  Dry mouth even throughout the day, does snore,   Has neck pain and radiates to left shoulder and also having pain in low back   Has done PT for neck and back recently and didn't help     Drinks 32 oz throughout the night     Review of Systems   All other systems reviewed and are negative.         Objective   Physical Exam  Vitals reviewed.   Constitutional:       General: He is not in acute distress.     Appearance: Normal appearance. He is not ill-appearing, toxic-appearing or diaphoretic.   HENT:      Head: Normocephalic and atraumatic.      Right Ear: External ear normal.      Left Ear: External ear normal.      Nose: Nose normal.      Mouth/Throat:      Mouth: Mucous membranes are moist.   Eyes:      Extraocular Movements: Extraocular movements intact.   Cardiovascular:      Rate and Rhythm: Normal rate and regular rhythm.      Heart sounds: Normal heart sounds. No murmur heard.     No friction rub. No gallop.   Pulmonary:      Effort: Pulmonary effort is normal.      Breath sounds: Normal breath sounds. No wheezing, rhonchi or rales.   Musculoskeletal:      Cervical back: Normal range of motion.      Comments: Midline TTP cervical  Spurling positive    TTP subscapular region left shoulder    TTP midline lumbar spine   Skin:     General: Skin is warm.   Neurological:      General: No focal deficit present.      Mental Status: He is alert.   Psychiatric:         Mood and Affect: Mood normal.                  An electronic signature was used to authenticate this note.    --Lauren Blair MD

## 2024-12-17 DIAGNOSIS — R79.82 ELEVATED C-REACTIVE PROTEIN (CRP): Primary | ICD-10-CM

## 2024-12-17 LAB
ANA SER QL IA: NEGATIVE
CCP IGG SERPL-ACNC: <0.5 U/ML (ref 0–2.9)
ENA SS-A AB SER IA-ACNC: <0.2 AI (ref 0–0.9)
ENA SS-B AB SER IA-ACNC: <0.2 AI (ref 0–0.9)

## 2024-12-27 RX ORDER — AZITHROMYCIN 250 MG/1
TABLET, FILM COATED ORAL
Qty: 6 TABLET | Refills: 0 | Status: SHIPPED | OUTPATIENT
Start: 2024-12-27 | End: 2025-01-06

## 2024-12-30 DIAGNOSIS — R79.82 ELEVATED C-REACTIVE PROTEIN (CRP): ICD-10-CM

## 2024-12-30 LAB
ALBUMIN SERPL-MCNC: 4.3 G/DL (ref 3.4–5)
ALBUMIN/GLOB SERPL: 1.5 {RATIO} (ref 1.1–2.2)
ALP SERPL-CCNC: 78 U/L (ref 40–129)
ALT SERPL-CCNC: 87 U/L (ref 10–40)
ANION GAP SERPL CALCULATED.3IONS-SCNC: 13 MMOL/L (ref 3–16)
AST SERPL-CCNC: 45 U/L (ref 15–37)
BASOPHILS # BLD: 0.1 K/UL (ref 0–0.2)
BASOPHILS NFR BLD: 0.6 %
BILIRUB SERPL-MCNC: <0.2 MG/DL (ref 0–1)
BUN SERPL-MCNC: 11 MG/DL (ref 7–20)
CALCIUM SERPL-MCNC: 9.5 MG/DL (ref 8.3–10.6)
CHLORIDE SERPL-SCNC: 102 MMOL/L (ref 99–110)
CO2 SERPL-SCNC: 25 MMOL/L (ref 21–32)
CREAT SERPL-MCNC: 0.6 MG/DL (ref 0.9–1.3)
CRP SERPL-MCNC: 17.8 MG/L (ref 0–5.1)
DEPRECATED RDW RBC AUTO: 13.9 % (ref 12.4–15.4)
EOSINOPHIL # BLD: 0.2 K/UL (ref 0–0.6)
EOSINOPHIL NFR BLD: 1.8 %
ERYTHROCYTE [SEDIMENTATION RATE] IN BLOOD BY WESTERGREN METHOD: 35 MM/HR (ref 0–15)
GFR SERPLBLD CREATININE-BSD FMLA CKD-EPI: >90 ML/MIN/{1.73_M2}
GLUCOSE SERPL-MCNC: 105 MG/DL (ref 70–99)
HCT VFR BLD AUTO: 42.9 % (ref 40.5–52.5)
HGB BLD-MCNC: 14.2 G/DL (ref 13.5–17.5)
LYMPHOCYTES # BLD: 2.4 K/UL (ref 1–5.1)
LYMPHOCYTES NFR BLD: 23.4 %
MCH RBC QN AUTO: 29.4 PG (ref 26–34)
MCHC RBC AUTO-ENTMCNC: 33.2 G/DL (ref 31–36)
MCV RBC AUTO: 88.6 FL (ref 80–100)
MONOCYTES # BLD: 0.7 K/UL (ref 0–1.3)
MONOCYTES NFR BLD: 7.1 %
NEUTROPHILS # BLD: 6.9 K/UL (ref 1.7–7.7)
NEUTROPHILS NFR BLD: 67.1 %
PLATELET # BLD AUTO: 258 K/UL (ref 135–450)
PMV BLD AUTO: 10.1 FL (ref 5–10.5)
POTASSIUM SERPL-SCNC: 4.5 MMOL/L (ref 3.5–5.1)
PROT SERPL-MCNC: 7.1 G/DL (ref 6.4–8.2)
RBC # BLD AUTO: 4.84 M/UL (ref 4.2–5.9)
SODIUM SERPL-SCNC: 140 MMOL/L (ref 136–145)
TSH SERPL DL<=0.005 MIU/L-ACNC: 1.9 UIU/ML (ref 0.27–4.2)
WBC # BLD AUTO: 10.3 K/UL (ref 4–11)

## 2024-12-31 ENCOUNTER — TELEPHONE (OUTPATIENT)
Dept: PRIMARY CARE CLINIC | Age: 39
End: 2024-12-31

## 2024-12-31 DIAGNOSIS — R79.89 ELEVATED LFTS: Primary | ICD-10-CM

## 2025-01-28 DIAGNOSIS — M62.838 TRAPEZIUS MUSCLE SPASM: ICD-10-CM

## 2025-01-28 NOTE — TELEPHONE ENCOUNTER
Medication:   Requested Prescriptions     Pending Prescriptions Disp Refills    baclofen (LIORESAL) 10 MG tablet 90 tablet 0     Sig: Take 1 tablet by mouth daily as needed (muscle cramping)     Last filled: 3/13/23  Last appt: 12/16/2024   Next appt: 1/28/2025    Last OARRS:        No data to display

## 2025-01-29 RX ORDER — BACLOFEN 10 MG/1
10 TABLET ORAL DAILY PRN
Qty: 90 TABLET | Refills: 0 | Status: SHIPPED | OUTPATIENT
Start: 2025-01-29 | End: 2025-01-29 | Stop reason: SDUPTHER

## 2025-01-29 NOTE — TELEPHONE ENCOUNTER
Medication:   Requested Prescriptions     Pending Prescriptions Disp Refills    diclofenac (VOLTAREN) 50 MG EC tablet 60 tablet 0     Sig: Take 1 tablet by mouth 2 times daily as needed for Pain     Last Filled:  8.29.24    Last appt: 12/16/2024   Next appt: Visit date not found    Last OARRS:        No data to display

## 2025-01-30 RX ORDER — BACLOFEN 10 MG/1
10 TABLET ORAL 2 TIMES DAILY PRN
Qty: 180 TABLET | Refills: 3 | Status: SHIPPED | OUTPATIENT
Start: 2025-01-30

## 2025-03-25 DIAGNOSIS — K21.00 GASTROESOPHAGEAL REFLUX DISEASE WITH ESOPHAGITIS, UNSPECIFIED WHETHER HEMORRHAGE: ICD-10-CM

## 2025-03-25 DIAGNOSIS — R19.5 DARK STOOLS: ICD-10-CM

## 2025-03-25 NOTE — TELEPHONE ENCOUNTER
Medication:   Requested Prescriptions     Pending Prescriptions Disp Refills    pantoprazole (PROTONIX) 40 MG tablet 180 tablet 1     Sig: TAKE 1 TABLET BY MOUTH TWICE A DAY BEFORE MEALS     Last filled: 10/22/24  Last appt: 12/16/2024   Next appt: Visit date not found    Last OARRS:        No data to display

## 2025-03-26 RX ORDER — PANTOPRAZOLE SODIUM 40 MG/1
TABLET, DELAYED RELEASE ORAL
Qty: 180 TABLET | Refills: 1 | Status: SHIPPED | OUTPATIENT
Start: 2025-03-26

## 2025-05-13 ENCOUNTER — TELEPHONE (OUTPATIENT)
Dept: ADMINISTRATIVE | Age: 40
End: 2025-05-13

## 2025-05-13 NOTE — TELEPHONE ENCOUNTER
Submitted PA for Pantoprazole Sodium 40MG dr tablets   Via [facility] Key: [id number]  STATUS: PENDING.    Follow up done daily; if no decision with in three days we will refax.  If another three days goes by with no decision will call the insurance for status.

## 2025-05-14 DIAGNOSIS — R19.5 DARK STOOLS: ICD-10-CM

## 2025-05-14 DIAGNOSIS — K21.00 GASTROESOPHAGEAL REFLUX DISEASE WITH ESOPHAGITIS, UNSPECIFIED WHETHER HEMORRHAGE: ICD-10-CM

## 2025-05-15 NOTE — TELEPHONE ENCOUNTER
Medication:   Requested Prescriptions     Pending Prescriptions Disp Refills    pantoprazole (PROTONIX) 40 MG tablet 180 tablet 1     Sig: TAKE 1 TABLET BY MOUTH TWICE A DAY BEFORE MEALS     Last Filled:  03/26/2025    Last appt: 12/16/2024   Next appt: Visit date not found    Last OARRS:        No data to display

## 2025-05-16 ENCOUNTER — PATIENT MESSAGE (OUTPATIENT)
Dept: PRIMARY CARE CLINIC | Age: 40
End: 2025-05-16

## 2025-05-16 RX ORDER — PANTOPRAZOLE SODIUM 40 MG/1
TABLET, DELAYED RELEASE ORAL
Qty: 180 TABLET | Refills: 1 | Status: SHIPPED | OUTPATIENT
Start: 2025-05-16

## 2025-05-16 NOTE — TELEPHONE ENCOUNTER
Patient Comment: Having troubled getting this refilled with insurance and currently out. New pharmacy [facility].

## 2025-05-19 ENCOUNTER — TELEPHONE (OUTPATIENT)
Dept: PRIMARY CARE CLINIC | Age: 40
End: 2025-05-19

## 2025-05-19 NOTE — TELEPHONE ENCOUNTER
Submitted PA for Pantoprazole Sodium 40MG dr tablets   Via [facility] Key: [id number]  STATUS: APPROVED       .Outcome  Approved today by Archana GAYLE 2017  CaseId:[id number];Status:Approved;Review Type:Qty;Coverage Start Date:05/13/2025;Coverage End Date:11/14/2025;  Effective Date: 5/13/2025  Authorization Expiration Date: 11/14/2025    If this requires a response please respond to the pool ( P MHCX PSC MEDICATION PRE-AUTH).      Thank you please advise patient.

## 2025-05-19 NOTE — TELEPHONE ENCOUNTER
Per status check this came in from On license of [facility] with the wrong insurance, resubmitted under correct plan and send urgently    Submitted PA for pantoprazole Via [facility] Key: [id number] STATUS: The member benefit does not include pharmacy drug coverage. Eligible drugs may be covered under the medical benefit.     Follow up done daily; if no decision with in three days we will refax.  If another three days goes by with no decision will call the insurance for status.

## 2025-05-19 NOTE — TELEPHONE ENCOUNTER
Nando Bingham to [facility] Clinical Staff (supporting Lauren Blair MD) (Selected Message)  HERIBERTO      5/19/25 11:52 AM  Hello,      I have new insurance. Please see attached. I tried returning your call but it went unanswered. Please let me know if you need any additional information.      Thank you!   Nando Bingham   Attachments   [id number]     [id number]    Pantoprazole

## 2025-05-28 ENCOUNTER — HOSPITAL ENCOUNTER (OUTPATIENT)
Dept: GENERAL RADIOLOGY | Age: 40
Discharge: HOME | End: 2025-05-28
Payer: COMMERCIAL

## 2025-05-28 ENCOUNTER — OFFICE VISIT (OUTPATIENT)
Dept: PRIMARY CARE CLINIC | Age: 40
End: 2025-05-28
Payer: COMMERCIAL

## 2025-05-28 ENCOUNTER — RESULTS FOLLOW-UP (OUTPATIENT)
Dept: PRIMARY CARE CLINIC | Age: 40
End: 2025-05-28

## 2025-05-28 VITALS
TEMPERATURE: 97.1 F | DIASTOLIC BLOOD PRESSURE: 82 MMHG | BODY MASS INDEX: 35.98 KG/M2 | SYSTOLIC BLOOD PRESSURE: 126 MMHG | OXYGEN SATURATION: 98 % | HEART RATE: 68 BPM | WEIGHT: 258 LBS

## 2025-05-28 DIAGNOSIS — R10.9 LEFT FLANK PAIN: ICD-10-CM

## 2025-05-28 DIAGNOSIS — R07.81 RIB PAIN ON LEFT SIDE: ICD-10-CM

## 2025-05-28 DIAGNOSIS — M62.838 TRAPEZIUS MUSCLE SPASM: ICD-10-CM

## 2025-05-28 DIAGNOSIS — J30.2 SEASONAL ALLERGIES: ICD-10-CM

## 2025-05-28 DIAGNOSIS — R07.81 RIB PAIN ON LEFT SIDE: Primary | ICD-10-CM

## 2025-05-28 DIAGNOSIS — M54.50 CHRONIC BILATERAL LOW BACK PAIN WITHOUT SCIATICA: ICD-10-CM

## 2025-05-28 DIAGNOSIS — M54.12 CERVICAL RADICULOPATHY: ICD-10-CM

## 2025-05-28 DIAGNOSIS — G89.29 CHRONIC BILATERAL LOW BACK PAIN WITHOUT SCIATICA: ICD-10-CM

## 2025-05-28 DIAGNOSIS — R79.89 ELEVATED LFTS: ICD-10-CM

## 2025-05-28 LAB
ALBUMIN SERPL-MCNC: 4.5 G/DL (ref 3.4–5)
ALBUMIN/GLOB SERPL: 1.6 {RATIO} (ref 1.1–2.2)
ALP SERPL-CCNC: 85 U/L (ref 40–129)
ALT SERPL-CCNC: 92 U/L (ref 10–40)
ANION GAP SERPL CALCULATED.3IONS-SCNC: 11 MMOL/L (ref 3–16)
AST SERPL-CCNC: 45 U/L (ref 15–37)
BILIRUB DIRECT SERPL-MCNC: <0.1 MG/DL (ref 0–0.3)
BILIRUB INDIRECT SERPL-MCNC: 0.2 MG/DL (ref 0–1)
BILIRUB SERPL-MCNC: 0.3 MG/DL (ref 0–1)
BILIRUBIN, POC: NORMAL
BLOOD URINE, POC: NORMAL
BUN SERPL-MCNC: 9 MG/DL (ref 7–20)
CALCIUM SERPL-MCNC: 9.6 MG/DL (ref 8.3–10.6)
CHLORIDE SERPL-SCNC: 102 MMOL/L (ref 99–110)
CLARITY, POC: CLEAR
CO2 SERPL-SCNC: 27 MMOL/L (ref 21–32)
COLOR, POC: YELLOW
CREAT SERPL-MCNC: 0.5 MG/DL (ref 0.9–1.3)
GFR SERPLBLD CREATININE-BSD FMLA CKD-EPI: >90 ML/MIN/{1.73_M2}
GLUCOSE SERPL-MCNC: 90 MG/DL (ref 70–99)
GLUCOSE URINE, POC: NORMAL MG/DL
KETONES, POC: NORMAL MG/DL
LEUKOCYTE EST, POC: NORMAL
LIPASE SERPL-CCNC: 24 U/L (ref 13–60)
NITRITE, POC: NORMAL
PH, POC: 6
POTASSIUM SERPL-SCNC: 4.6 MMOL/L (ref 3.5–5.1)
PROT SERPL-MCNC: 7.3 G/DL (ref 6.4–8.2)
PROTEIN, POC: NORMAL MG/DL
SODIUM SERPL-SCNC: 140 MMOL/L (ref 136–145)
SPECIFIC GRAVITY, POC: 1.02
UROBILINOGEN, POC: 2 MG/DL

## 2025-05-28 PROCEDURE — 3079F DIAST BP 80-89 MM HG: CPT | Performed by: STUDENT IN AN ORGANIZED HEALTH CARE EDUCATION/TRAINING PROGRAM

## 2025-05-28 PROCEDURE — 99214 OFFICE O/P EST MOD 30 MIN: CPT | Performed by: STUDENT IN AN ORGANIZED HEALTH CARE EDUCATION/TRAINING PROGRAM

## 2025-05-28 PROCEDURE — 81002 URINALYSIS NONAUTO W/O SCOPE: CPT | Performed by: STUDENT IN AN ORGANIZED HEALTH CARE EDUCATION/TRAINING PROGRAM

## 2025-05-28 PROCEDURE — 71101 X-RAY EXAM UNILAT RIBS/CHEST: CPT

## 2025-05-28 PROCEDURE — 74018 RADEX ABDOMEN 1 VIEW: CPT

## 2025-05-28 PROCEDURE — 3074F SYST BP LT 130 MM HG: CPT | Performed by: STUDENT IN AN ORGANIZED HEALTH CARE EDUCATION/TRAINING PROGRAM

## 2025-05-28 RX ORDER — FLUOCINOLONE ACETONIDE 0.11 MG/ML
5 OIL AURICULAR (OTIC) 2 TIMES DAILY
Qty: 20 ML | Refills: 0 | Status: SHIPPED | OUTPATIENT
Start: 2025-05-28 | End: 2025-06-11

## 2025-05-28 RX ORDER — LIDOCAINE 50 MG/G
1 PATCH TOPICAL DAILY
Qty: 30 PATCH | Refills: 0 | Status: SHIPPED | OUTPATIENT
Start: 2025-05-28

## 2025-05-28 SDOH — ECONOMIC STABILITY: TRANSPORTATION INSECURITY
IN THE PAST 12 MONTHS, HAS THE LACK OF TRANSPORTATION KEPT YOU FROM MEDICAL APPOINTMENTS OR FROM GETTING MEDICATIONS?: NO

## 2025-05-28 SDOH — ECONOMIC STABILITY: FOOD INSECURITY: WITHIN THE PAST 12 MONTHS, YOU WORRIED THAT YOUR FOOD WOULD RUN OUT BEFORE YOU GOT MONEY TO BUY MORE.: OFTEN TRUE

## 2025-05-28 SDOH — ECONOMIC STABILITY: FOOD INSECURITY: WITHIN THE PAST 12 MONTHS, THE FOOD YOU BOUGHT JUST DIDN'T LAST AND YOU DIDN'T HAVE MONEY TO GET MORE.: SOMETIMES TRUE

## 2025-05-28 SDOH — ECONOMIC STABILITY: INCOME INSECURITY: IN THE LAST 12 MONTHS, WAS THERE A TIME WHEN YOU WERE NOT ABLE TO PAY THE MORTGAGE OR RENT ON TIME?: YES

## 2025-05-28 SDOH — ECONOMIC STABILITY: TRANSPORTATION INSECURITY
IN THE PAST 12 MONTHS, HAS LACK OF TRANSPORTATION KEPT YOU FROM MEETINGS, WORK, OR FROM GETTING THINGS NEEDED FOR DAILY LIVING?: NO

## 2025-05-28 ASSESSMENT — PATIENT HEALTH QUESTIONNAIRE - PHQ9
8. MOVING OR SPEAKING SO SLOWLY THAT OTHER PEOPLE COULD HAVE NOTICED. OR THE OPPOSITE - BEING SO FIDGETY OR RESTLESS THAT YOU HAVE BEEN MOVING AROUND A LOT MORE THAN USUAL: SEVERAL DAYS
4. FEELING TIRED OR HAVING LITTLE ENERGY: NEARLY EVERY DAY
SUM OF ALL RESPONSES TO PHQ QUESTIONS 1-9: 12
5. POOR APPETITE OR OVEREATING: SEVERAL DAYS
SUM OF ALL RESPONSES TO PHQ QUESTIONS 1-9: 12
3. TROUBLE FALLING OR STAYING ASLEEP: SEVERAL DAYS
8. MOVING OR SPEAKING SO SLOWLY THAT OTHER PEOPLE COULD HAVE NOTICED. OR THE OPPOSITE, BEING SO FIGETY OR RESTLESS THAT YOU HAVE BEEN MOVING AROUND A LOT MORE THAN USUAL: SEVERAL DAYS
10. IF YOU CHECKED OFF ANY PROBLEMS, HOW DIFFICULT HAVE THESE PROBLEMS MADE IT FOR YOU TO DO YOUR WORK, TAKE CARE OF THINGS AT HOME, OR GET ALONG WITH OTHER PEOPLE: SOMEWHAT DIFFICULT
7. TROUBLE CONCENTRATING ON THINGS, SUCH AS READING THE NEWSPAPER OR WATCHING TELEVISION: SEVERAL DAYS
7. TROUBLE CONCENTRATING ON THINGS, SUCH AS READING THE NEWSPAPER OR WATCHING TELEVISION: SEVERAL DAYS
SUM OF ALL RESPONSES TO PHQ QUESTIONS 1-9: 12
3. TROUBLE FALLING OR STAYING ASLEEP: SEVERAL DAYS
6. FEELING BAD ABOUT YOURSELF - OR THAT YOU ARE A FAILURE OR HAVE LET YOURSELF OR YOUR FAMILY DOWN: NEARLY EVERY DAY
9. THOUGHTS THAT YOU WOULD BE BETTER OFF DEAD, OR OF HURTING YOURSELF: NOT AT ALL
9. THOUGHTS THAT YOU WOULD BE BETTER OFF DEAD, OR OF HURTING YOURSELF: NOT AT ALL
1. LITTLE INTEREST OR PLEASURE IN DOING THINGS: SEVERAL DAYS
2. FEELING DOWN, DEPRESSED OR HOPELESS: SEVERAL DAYS
1. LITTLE INTEREST OR PLEASURE IN DOING THINGS: SEVERAL DAYS
SUM OF ALL RESPONSES TO PHQ QUESTIONS 1-9: 12
5. POOR APPETITE OR OVEREATING: SEVERAL DAYS
10. IF YOU CHECKED OFF ANY PROBLEMS, HOW DIFFICULT HAVE THESE PROBLEMS MADE IT FOR YOU TO DO YOUR WORK, TAKE CARE OF THINGS AT HOME, OR GET ALONG WITH OTHER PEOPLE: SOMEWHAT DIFFICULT
6. FEELING BAD ABOUT YOURSELF - OR THAT YOU ARE A FAILURE OR HAVE LET YOURSELF OR YOUR FAMILY DOWN: NEARLY EVERY DAY
2. FEELING DOWN, DEPRESSED OR HOPELESS: SEVERAL DAYS
SUM OF ALL RESPONSES TO PHQ QUESTIONS 1-9: 12
4. FEELING TIRED OR HAVING LITTLE ENERGY: NEARLY EVERY DAY

## 2025-05-28 NOTE — PROGRESS NOTES
Nando Bingham (:  1985) is a 39 y.o. male,Established patient, here for evaluation of the following chief complaint(s):  Neck Pain (Pt says this has bene going on for years now. ) and Abdominal Pain (Pt is stating he's been in pain for about two weeks now, he says this aslo radiates to the lower part of his back. )      Assessment & Plan  Rib pain on left side       Orders:    Comprehensive Metabolic Panel; Future    POCT Urinalysis no Micro    XR RIBS LEFT INCLUDE CHEST (MIN 3 VIEWS); Future    Lipase; Future    XR ABDOMEN (KUB) (SINGLE AP VIEW); Future    Left flank pain       Orders:    Comprehensive Metabolic Panel; Future    POCT Urinalysis no Micro    XR RIBS LEFT INCLUDE CHEST (MIN 3 VIEWS); Future    Lipase; Future    XR ABDOMEN (KUB) (SINGLE AP VIEW); Future    Trapezius muscle spasm       Orders:    diclofenac (VOLTAREN) 50 MG EC tablet; Take 1 tablet by mouth 2 times daily as needed for Pain    Comprehensive Metabolic Panel; Future    POCT Urinalysis no Micro    XR RIBS LEFT INCLUDE CHEST (MIN 3 VIEWS); Future    Lipase; Future    XR ABDOMEN (KUB) (SINGLE AP VIEW); Future    Seasonal allergies       Orders:    Leandra Falcon MD, Allergy & Immunology, [facility]    Comprehensive Metabolic Panel; Future    POCT Urinalysis no Micro    XR RIBS LEFT INCLUDE CHEST (MIN 3 VIEWS); Future    Lipase; Future    XR ABDOMEN (KUB) (SINGLE AP VIEW); Future    Chronic bilateral low back pain without sciatica       Orders:    lidocaine (LIDODERM) 5 %; Place 1 patch onto the skin daily 12 hours on, 12 hours off.    [facility] [place] [facility]OSR    Cervical radiculopathy       Orders:    MRI CERVICAL SPINE WO CONTRAST; Future    [facility] [place] [facility]R      No follow-ups on file.    Subjective       HPI    Constant dull pain in left flank radiating to front of abdomen x 2 weeks, also with some left groin pain, some burning pain when urinating

## 2025-06-02 NOTE — PLAN OF CARE
re-education.    Manual Therapy (15294) as indicated to include: Grade V Manipulation, Soft Tissue Mobilization, and Dry Needling/IASTM  Modalities as needed that may include: Cryotherapy and Thermal Agents  Patient education on joint protection, postural re-education, activity modification, and progression of HEP    Plan: POC initiated as per evaluation    Electronically Signed by Adam Tatum, OVI  Date: 06/03/2025   Note: Portions of this note have been templated and/or copied from initial evaluation, reassessments and prior notes for documentation efficiency.  Note: If patient does not return for scheduled/recommended follow up visits, this note will serve as a discharge from care along with the most recent update on progress.    Ortho Evaluation

## 2025-06-03 ENCOUNTER — HOSPITAL ENCOUNTER (OUTPATIENT)
Dept: PHYSICAL THERAPY | Age: 40
Setting detail: THERAPIES SERIES
Discharge: HOME | End: 2025-06-03
Payer: COMMERCIAL

## 2025-06-03 DIAGNOSIS — M54.50 CHRONIC BILATERAL LOW BACK PAIN WITHOUT SCIATICA: ICD-10-CM

## 2025-06-03 DIAGNOSIS — G89.29 CHRONIC BILATERAL LOW BACK PAIN WITHOUT SCIATICA: ICD-10-CM

## 2025-06-03 DIAGNOSIS — M54.12 CERVICAL RADICULOPATHY: Primary | ICD-10-CM

## 2025-06-03 PROCEDURE — 97161 PT EVAL LOW COMPLEX 20 MIN: CPT

## 2025-06-03 PROCEDURE — 97110 THERAPEUTIC EXERCISES: CPT

## 2025-06-04 ENCOUNTER — HOSPITAL ENCOUNTER (OUTPATIENT)
Dept: ULTRASOUND IMAGING | Age: 40
Discharge: HOME | End: 2025-06-04
Payer: COMMERCIAL

## 2025-06-04 ENCOUNTER — RESULTS FOLLOW-UP (OUTPATIENT)
Dept: PRIMARY CARE CLINIC | Age: 40
End: 2025-06-04

## 2025-06-04 DIAGNOSIS — K76.0 FATTY LIVER: ICD-10-CM

## 2025-06-04 DIAGNOSIS — R16.0 HEPATOMEGALY: Primary | ICD-10-CM

## 2025-06-04 DIAGNOSIS — R79.89 ELEVATED LFTS: ICD-10-CM

## 2025-06-04 PROCEDURE — 76705 ECHO EXAM OF ABDOMEN: CPT

## 2025-06-05 ENCOUNTER — HOSPITAL ENCOUNTER (OUTPATIENT)
Dept: CT IMAGING | Age: 40
Discharge: HOME | End: 2025-06-05
Payer: COMMERCIAL

## 2025-06-05 ENCOUNTER — PATIENT MESSAGE (OUTPATIENT)
Dept: PRIMARY CARE CLINIC | Age: 40
End: 2025-06-05

## 2025-06-05 DIAGNOSIS — R10.12 LEFT UPPER QUADRANT ABDOMINAL PAIN: Primary | ICD-10-CM

## 2025-06-05 DIAGNOSIS — R10.12 LEFT UPPER QUADRANT ABDOMINAL PAIN: ICD-10-CM

## 2025-06-05 PROCEDURE — 74176 CT ABD & PELVIS W/O CONTRAST: CPT

## 2025-06-06 ENCOUNTER — RESULTS FOLLOW-UP (OUTPATIENT)
Dept: PRIMARY CARE CLINIC | Age: 40
End: 2025-06-06

## 2025-06-06 RX ORDER — ONDANSETRON 4 MG/1
4 TABLET, FILM COATED ORAL EVERY 12 HOURS PRN
Qty: 30 TABLET | Refills: 0 | Status: SHIPPED | OUTPATIENT
Start: 2025-06-06

## 2025-06-10 ENCOUNTER — HOSPITAL ENCOUNTER (OUTPATIENT)
Dept: PHYSICAL THERAPY | Age: 40
Setting detail: THERAPIES SERIES
Discharge: HOME | End: 2025-06-10
Payer: COMMERCIAL

## 2025-06-10 PROCEDURE — 97110 THERAPEUTIC EXERCISES: CPT

## 2025-06-10 PROCEDURE — 97140 MANUAL THERAPY 1/> REGIONS: CPT

## 2025-06-10 NOTE — FLOWSHEET NOTE
45%     [facility]: [address], [place] [address] office: [phone] fax: [phone]    Physical Therapy: TREATMENT/PROGRESS NOTE   Patient: Nando Bingham (39 y.o. male)   Examination Date: 06/10/2025   :  1985 MRN: [id number]   Visit #: 2   Insurance:[employer]igna  Ded: 0  OOP: 8700/ 172.44  Codes Billable: yes  Copay: 35.00  Coins: 0  TeleHealth: yes  Visit Limit: 20/ 0 used/ each/ hard max   Auth Req: no  Insurance Allowable Auth Needed   20 []Yes    [x]No    Insurance: Payor: CIGNA / Plan: CIGNA / Product Type: *No Product type* /   Insurance ID: [id number] - (Commercial)  Secondary Insurance (if applicable):    Treatment Diagnosis:     ICD-10-CM    1. Cervical radiculopathy  M54.12       2. 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    Preferred Language for Healthcare:   [x] English       [] other:    SUBJECTIVE EXAMINATION     Patient stated complaint:   Patient presents to PT with low back pain, left

## 2025-06-17 ENCOUNTER — APPOINTMENT (OUTPATIENT)
Dept: PHYSICAL THERAPY | Age: 40
End: 2025-06-17
Payer: COMMERCIAL

## 2025-06-19 DIAGNOSIS — I10 BENIGN ESSENTIAL HTN: ICD-10-CM

## 2025-06-19 RX ORDER — METOPROLOL SUCCINATE 50 MG/1
50 TABLET, EXTENDED RELEASE ORAL DAILY
Qty: 90 TABLET | Refills: 1 | Status: SHIPPED | OUTPATIENT
Start: 2025-06-19

## 2025-06-19 NOTE — TELEPHONE ENCOUNTER
Medication:   Requested Prescriptions     Pending Prescriptions Disp Refills    metoprolol succinate (TOPROL XL) 50 MG extended release tablet [Pharmacy Med Name: METOPROLOL ER SUCCINATE 50MG TABS] 90 tablet 1     Sig: TAKE 1 TABLET BY MOUTH EVERY DAY     Last Filled:  12.12.24    Last appt: 5/28/2025   Next appt: Visit date not found    Last OARRS:        No data to display

## 2025-06-20 ENCOUNTER — HOSPITAL ENCOUNTER (OUTPATIENT)
Dept: PHYSICAL THERAPY | Age: 40
Setting detail: THERAPIES SERIES
Discharge: HOME | End: 2025-06-20
Payer: COMMERCIAL

## 2025-06-20 PROCEDURE — 97140 MANUAL THERAPY 1/> REGIONS: CPT

## 2025-06-20 PROCEDURE — 97530 THERAPEUTIC ACTIVITIES: CPT

## 2025-06-20 PROCEDURE — 97110 THERAPEUTIC EXERCISES: CPT

## 2025-06-20 NOTE — FLOWSHEET NOTE
listed.    [] Progression is slowed due to complexities/Impairments listed.  [] Progression has been slowed due to co-morbidities.  [x] Plan just implemented, too soon (<30days) to assess goals progression   [] Goals require adjustment due to lack of progress  [] Patient is not progressing as expected and requires additional follow up with physician  [] Other:     TREATMENT PLAN     Frequency/Duration: 1-2x/week for 4-6 weeks for the following treatment interventions:    Interventions:  Therapeutic Exercise (64463) including: strength training, ROM, and functional mobility  Therapeutic Activities (49541) including: functional mobility training and education.  Neuromuscular Re-education (71895) activation and proprioception, including postural re-education.    Manual Therapy (61323) as indicated to include: Grade V Manipulation, Soft Tissue Mobilization, and Dry Needling/IASTM  Modalities as needed that may include: Cryotherapy and Thermal Agents  Patient education on joint protection, postural re-education, activity modification, and progression of HEP    Plan: Continue with mobilizations and progress strengthening program.    Electronically Signed by Adam Tatum PT  Date: 06/20/2025   Note: Portions of this note have been templated and/or copied from initial evaluation, reassessments and prior notes for documentation efficiency.  Note: If patient does not return for scheduled/recommended follow up visits, this note will serve as a discharge from care along with the most recent update on progress.    Ortho Evaluation

## 2025-06-26 DIAGNOSIS — M62.838 TRAPEZIUS MUSCLE SPASM: ICD-10-CM

## 2025-06-26 NOTE — TELEPHONE ENCOUNTER
Medication:   Requested Prescriptions     Pending Prescriptions Disp Refills    diclofenac (VOLTAREN) 50 MG EC tablet [Pharmacy Med Name: DICLOFENAC SODIUM 50MG DR TABLETS] 60 tablet 0     Sig: TAKE 1 TABLET BY MOUTH TWICE DAILY AS NEEDED FOR PAIN     Last Filled:  05/28/2025    Last appt: 5/28/2025   Next appt: Visit date not found    Last OARRS:        No data to display

## 2025-06-27 ENCOUNTER — HOSPITAL ENCOUNTER (OUTPATIENT)
Dept: PHYSICAL THERAPY | Age: 40
Setting detail: THERAPIES SERIES
Discharge: HOME | End: 2025-06-27
Payer: COMMERCIAL

## 2025-06-27 PROCEDURE — 97530 THERAPEUTIC ACTIVITIES: CPT

## 2025-06-27 PROCEDURE — 97140 MANUAL THERAPY 1/> REGIONS: CPT

## 2025-06-27 PROCEDURE — 97112 NEUROMUSCULAR REEDUCATION: CPT

## 2025-06-27 NOTE — FLOWSHEET NOTE
Typically 20 minutes face-to-face)     Neuromusc. Re-ed (38941) 8 1  Re-Eval (48327)     Manual (92258) 10 1  Estim Unattended (40109)     Ther. Act (61306) 20 1  Mech. Traction (17278)     Gait (85421)    Dry Needle 1-2 muscle (91408)     Aquatic Therex (04989)    Dry Needle 3+ muscle (20561)     Iontophoresis (30425)    VASO (01760)     Ultrasound (20762)    Group Therapy (15832)     Estim Attended (42750)    Canalith Repositioning (24399)     Physical Performance Test (53218)    Custom orthotic ()     Other:    Other:    Total Timed Code Tx Minutes 38 3       Total Treatment Minutes 12:38-1:30        Charge Justification:  (48981) NEUROMUSCULAR RE-EDUCATION - Provided therapeutic procedure on activities related to neuromuscular reeducation of movement, balance, coordination, kinesthetic sense, posture, and/or proprioception for sitting and/or standing activities. Provided HEP review and/or progression.  (31880) THERAPEUTIC ACTIVITY - use of dynamic activities to improve functional performance. (Ex include squatting, ascending/descending stairs, walking, bending, lifting, catching, throwing, pushing, pulling, jumping.)  Direct, one on one contact, billed in 15-minute increments.  (99764) MANUAL THERAPY -  Manual therapy techniques, 1 or more regions, each 15 minutes (Mobilization/manipulation, manual lymphatic drainage, manual traction) for the purpose of modulating pain, promoting relaxation,  increasing ROM, reducing/eliminating soft tissue swelling/inflammation/restriction, improving soft tissue extensibility and allowing for proper ROM for normal function with self care, mobility, lifting and ambulation    GOALS     Patient stated goal: improve movement, reduce pain  [] Progressing: [] Met: [] Not Met: [] Adjusted    Therapist goals for Patient:   Short Term Goals: To be achieved in: 2 weeks  Independent in HEP and progression per patient tolerance, in order to prevent re-injury.   [] Progressing: [] Met:

## 2025-06-30 ENCOUNTER — HOSPITAL ENCOUNTER (OUTPATIENT)
Dept: MRI IMAGING | Age: 40
Discharge: HOME | End: 2025-06-30
Payer: COMMERCIAL

## 2025-06-30 DIAGNOSIS — M54.12 CERVICAL RADICULOPATHY: ICD-10-CM

## 2025-06-30 PROCEDURE — 72141 MRI NECK SPINE W/O DYE: CPT

## 2025-07-02 DIAGNOSIS — M54.12 CERVICAL RADICULOPATHY: Primary | ICD-10-CM

## 2025-07-03 ENCOUNTER — APPOINTMENT (OUTPATIENT)
Dept: PHYSICAL THERAPY | Age: 40
End: 2025-07-03
Payer: COMMERCIAL

## 2025-07-08 ENCOUNTER — HOSPITAL ENCOUNTER (OUTPATIENT)
Dept: PHYSICAL THERAPY | Age: 40
Setting detail: THERAPIES SERIES
Discharge: HOME | End: 2025-07-08
Payer: COMMERCIAL

## 2025-07-08 PROCEDURE — 97112 NEUROMUSCULAR REEDUCATION: CPT

## 2025-07-08 PROCEDURE — 97140 MANUAL THERAPY 1/> REGIONS: CPT

## 2025-07-08 PROCEDURE — 97110 THERAPEUTIC EXERCISES: CPT

## 2025-07-08 NOTE — PLAN OF CARE
[facility] - Outpatient [facility] Therapy: [address], [place] [address] office: [phone] fax: [phone]    Physical Therapy Re-Certification Plan of Care    Dear Lauren Blair MD  ,    We had the pleasure of treating the following patient for physical therapy services at [facility]. A summary of our findings can be found in the updated assessment below.  This includes our plan of care.  If you have any questions or concerns regarding these findings, please do not hesitate to contact me at the office phone number checked above.  Thank you for the referral.     Physician Signature:________________________________Date:__________________  By signing above (or electronic signature), therapist's plan is approved by physician      Total Visits: 5     Pt. continues to present with functional deficits in ROM, joint mobility, and strength symmetry  limiting ability with walking up/down stairs, pushing or pulling activity, light home activity, heavy home activity, and prolonged positioning .  During therapy this date, patient required visual cueing, verbal cueing, tactile cueing, muscle facilitation, modification of technique, and progression of exercises and program for exercise progression, improving proper muscle recruitment and activation/motor control patterns, modulating pain, and promoting relaxation. Patient will continue to benefit from ongoing evaluation and advanced clinical decision from a Physical Therapist to improve pain control, ROM, muscle strength, neuromuscular control, balance and proprioception, and functional mobility to safely return to OF without symptoms or restrictions.     Overall Response to Treatment:  Patient is responding well to treatment and improvement is noted with regards to goals    Recommendation:    [x] Continue PT 1x / 1-2 wks for 8 weeks.   [] Hold PT, pending MD visit   [] Discharge to [facility]. Follow up with

## 2025-07-22 ENCOUNTER — APPOINTMENT (OUTPATIENT)
Dept: PHYSICAL THERAPY | Age: 40
End: 2025-07-22
Payer: COMMERCIAL

## 2025-07-26 DIAGNOSIS — M62.838 TRAPEZIUS MUSCLE SPASM: ICD-10-CM

## 2025-07-28 NOTE — TELEPHONE ENCOUNTER
Medication:   Requested Prescriptions     Pending Prescriptions Disp Refills    diclofenac (VOLTAREN) 50 MG EC tablet [Pharmacy Med Name: DICLOFENAC SODIUM 50MG DR TABLETS] 60 tablet 0     Sig: TAKE 1 TABLET BY MOUTH TWICE DAILY AS NEEDED FOR PAIN     Last Filled:  6/26/2025    Last appt: 5/28/2025   Next appt:   Last OARRS:        No data to display

## 2025-08-25 DIAGNOSIS — M62.838 TRAPEZIUS MUSCLE SPASM: ICD-10-CM
